# Patient Record
Sex: FEMALE | Race: BLACK OR AFRICAN AMERICAN | NOT HISPANIC OR LATINO | URBAN - METROPOLITAN AREA
[De-identification: names, ages, dates, MRNs, and addresses within clinical notes are randomized per-mention and may not be internally consistent; named-entity substitution may affect disease eponyms.]

---

## 2018-01-24 ENCOUNTER — EMERGENCY (EMERGENCY)
Facility: HOSPITAL | Age: 53
LOS: 1 days | Discharge: HOME | End: 2018-01-24
Attending: EMERGENCY MEDICINE | Admitting: EMERGENCY MEDICINE
Payer: MEDICAID

## 2018-01-24 VITALS
HEART RATE: 101 BPM | DIASTOLIC BLOOD PRESSURE: 87 MMHG | TEMPERATURE: 99 F | SYSTOLIC BLOOD PRESSURE: 136 MMHG | OXYGEN SATURATION: 99 % | HEIGHT: 72 IN | WEIGHT: 293 LBS | RESPIRATION RATE: 16 BRPM

## 2018-01-24 VITALS
HEART RATE: 98 BPM | DIASTOLIC BLOOD PRESSURE: 89 MMHG | TEMPERATURE: 98 F | SYSTOLIC BLOOD PRESSURE: 144 MMHG | RESPIRATION RATE: 18 BRPM | OXYGEN SATURATION: 98 %

## 2018-01-24 DIAGNOSIS — Z98.89 OTHER SPECIFIED POSTPROCEDURAL STATES: Chronic | ICD-10-CM

## 2018-01-24 LAB
ALBUMIN SERPL ELPH-MCNC: 4.2 G/DL — SIGNIFICANT CHANGE UP (ref 3.3–5.2)
ALP SERPL-CCNC: 46 U/L — SIGNIFICANT CHANGE UP (ref 40–120)
ALT FLD-CCNC: 19 U/L — SIGNIFICANT CHANGE UP
ANION GAP SERPL CALC-SCNC: 12 MMOL/L — SIGNIFICANT CHANGE UP (ref 5–17)
APPEARANCE UR: CLEAR — SIGNIFICANT CHANGE UP
AST SERPL-CCNC: 30 U/L — SIGNIFICANT CHANGE UP
BASOPHILS # BLD AUTO: 0 K/UL — SIGNIFICANT CHANGE UP (ref 0–0.2)
BASOPHILS NFR BLD AUTO: 1 % — SIGNIFICANT CHANGE UP (ref 0–2)
BILIRUB SERPL-MCNC: 0.5 MG/DL — SIGNIFICANT CHANGE UP (ref 0.4–2)
BILIRUB UR-MCNC: NEGATIVE — SIGNIFICANT CHANGE UP
BUN SERPL-MCNC: 11 MG/DL — SIGNIFICANT CHANGE UP (ref 8–20)
CALCIUM SERPL-MCNC: 9.6 MG/DL — SIGNIFICANT CHANGE UP (ref 8.6–10.2)
CHLORIDE SERPL-SCNC: 96 MMOL/L — LOW (ref 98–107)
CO2 SERPL-SCNC: 28 MMOL/L — SIGNIFICANT CHANGE UP (ref 22–29)
COLOR SPEC: YELLOW — SIGNIFICANT CHANGE UP
CREAT SERPL-MCNC: 1.02 MG/DL — SIGNIFICANT CHANGE UP (ref 0.5–1.3)
DIFF PNL FLD: ABNORMAL
EOSINOPHIL # BLD AUTO: 0 K/UL — SIGNIFICANT CHANGE UP (ref 0–0.5)
EOSINOPHIL NFR BLD AUTO: 1 % — SIGNIFICANT CHANGE UP (ref 0–5)
GLUCOSE SERPL-MCNC: 133 MG/DL — HIGH (ref 70–115)
GLUCOSE UR QL: NEGATIVE MG/DL — SIGNIFICANT CHANGE UP
HCT VFR BLD CALC: 45.7 % — SIGNIFICANT CHANGE UP (ref 37–47)
HGB BLD-MCNC: 15 G/DL — SIGNIFICANT CHANGE UP (ref 12–16)
KETONES UR-MCNC: NEGATIVE — SIGNIFICANT CHANGE UP
LACTATE BLDV-MCNC: 1.6 MMOL/L — SIGNIFICANT CHANGE UP (ref 0.5–2)
LEUKOCYTE ESTERASE UR-ACNC: ABNORMAL
LYMPHOCYTES # BLD AUTO: 3.2 K/UL — SIGNIFICANT CHANGE UP (ref 1–4.8)
LYMPHOCYTES # BLD AUTO: 54 % — SIGNIFICANT CHANGE UP (ref 20–55)
MCHC RBC-ENTMCNC: 29.2 PG — SIGNIFICANT CHANGE UP (ref 27–31)
MCHC RBC-ENTMCNC: 32.8 G/DL — SIGNIFICANT CHANGE UP (ref 32–36)
MCV RBC AUTO: 89.1 FL — SIGNIFICANT CHANGE UP (ref 81–99)
MONOCYTES # BLD AUTO: 0.6 K/UL — SIGNIFICANT CHANGE UP (ref 0–0.8)
MONOCYTES NFR BLD AUTO: 11 % — HIGH (ref 3–10)
NEUTROPHILS # BLD AUTO: 1.4 K/UL — LOW (ref 1.8–8)
NEUTROPHILS NFR BLD AUTO: 30 % — LOW (ref 37–73)
NITRITE UR-MCNC: NEGATIVE — SIGNIFICANT CHANGE UP
PH UR: 6 — SIGNIFICANT CHANGE UP (ref 5–8)
PLAT MORPH BLD: NORMAL — SIGNIFICANT CHANGE UP
PLATELET # BLD AUTO: 228 K/UL — SIGNIFICANT CHANGE UP (ref 150–400)
POTASSIUM SERPL-MCNC: 4.9 MMOL/L — SIGNIFICANT CHANGE UP (ref 3.5–5.3)
POTASSIUM SERPL-SCNC: 4.9 MMOL/L — SIGNIFICANT CHANGE UP (ref 3.5–5.3)
PROT SERPL-MCNC: 8.1 G/DL — SIGNIFICANT CHANGE UP (ref 6.6–8.7)
PROT UR-MCNC: 30 MG/DL
RBC # BLD: 5.13 M/UL — SIGNIFICANT CHANGE UP (ref 4.4–5.2)
RBC # FLD: 13.6 % — SIGNIFICANT CHANGE UP (ref 11–15.6)
RBC BLD AUTO: NORMAL — SIGNIFICANT CHANGE UP
SODIUM SERPL-SCNC: 136 MMOL/L — SIGNIFICANT CHANGE UP (ref 135–145)
SP GR SPEC: 1.01 — SIGNIFICANT CHANGE UP (ref 1.01–1.02)
UROBILINOGEN FLD QL: NEGATIVE MG/DL — SIGNIFICANT CHANGE UP
VARIANT LYMPHS # BLD: 3 % — SIGNIFICANT CHANGE UP (ref 0–6)
WBC # BLD: 5.3 K/UL — SIGNIFICANT CHANGE UP (ref 4.8–10.8)
WBC # FLD AUTO: 5.3 K/UL — SIGNIFICANT CHANGE UP (ref 4.8–10.8)
WBC UR QL: SIGNIFICANT CHANGE UP

## 2018-01-24 PROCEDURE — 82553 CREATINE MB FRACTION: CPT

## 2018-01-24 PROCEDURE — 99284 EMERGENCY DEPT VISIT MOD MDM: CPT | Mod: 25

## 2018-01-24 PROCEDURE — 85027 COMPLETE CBC AUTOMATED: CPT

## 2018-01-24 PROCEDURE — 99284 EMERGENCY DEPT VISIT MOD MDM: CPT

## 2018-01-24 PROCEDURE — 70450 CT HEAD/BRAIN W/O DYE: CPT | Mod: 26

## 2018-01-24 PROCEDURE — 82550 ASSAY OF CK (CPK): CPT

## 2018-01-24 PROCEDURE — 36415 COLL VENOUS BLD VENIPUNCTURE: CPT

## 2018-01-24 PROCEDURE — 70450 CT HEAD/BRAIN W/O DYE: CPT

## 2018-01-24 PROCEDURE — 83605 ASSAY OF LACTIC ACID: CPT

## 2018-01-24 PROCEDURE — 81001 URINALYSIS AUTO W/SCOPE: CPT

## 2018-01-24 PROCEDURE — 96375 TX/PRO/DX INJ NEW DRUG ADDON: CPT

## 2018-01-24 PROCEDURE — 96374 THER/PROPH/DIAG INJ IV PUSH: CPT

## 2018-01-24 PROCEDURE — 80053 COMPREHEN METABOLIC PANEL: CPT

## 2018-01-24 RX ORDER — SODIUM CHLORIDE 9 MG/ML
1000 INJECTION INTRAMUSCULAR; INTRAVENOUS; SUBCUTANEOUS ONCE
Qty: 0 | Refills: 0 | Status: COMPLETED | OUTPATIENT
Start: 2018-01-24 | End: 2018-01-24

## 2018-01-24 RX ORDER — MORPHINE SULFATE 50 MG/1
4 CAPSULE, EXTENDED RELEASE ORAL ONCE
Qty: 0 | Refills: 0 | Status: DISCONTINUED | OUTPATIENT
Start: 2018-01-24 | End: 2018-01-24

## 2018-01-24 RX ORDER — ONDANSETRON 8 MG/1
4 TABLET, FILM COATED ORAL ONCE
Qty: 0 | Refills: 0 | Status: COMPLETED | OUTPATIENT
Start: 2018-01-24 | End: 2018-01-24

## 2018-01-24 RX ORDER — METOCLOPRAMIDE HCL 10 MG
10 TABLET ORAL ONCE
Qty: 0 | Refills: 0 | Status: COMPLETED | OUTPATIENT
Start: 2018-01-24 | End: 2018-01-24

## 2018-01-24 RX ORDER — KETOROLAC TROMETHAMINE 30 MG/ML
30 SYRINGE (ML) INJECTION ONCE
Qty: 0 | Refills: 0 | Status: DISCONTINUED | OUTPATIENT
Start: 2018-01-24 | End: 2018-01-24

## 2018-01-24 RX ADMIN — Medication 30 MILLIGRAM(S): at 14:45

## 2018-01-24 RX ADMIN — Medication 10 MILLIGRAM(S): at 13:44

## 2018-01-24 RX ADMIN — ONDANSETRON 4 MILLIGRAM(S): 8 TABLET, FILM COATED ORAL at 13:09

## 2018-01-24 RX ADMIN — SODIUM CHLORIDE 1000 MILLILITER(S): 9 INJECTION INTRAMUSCULAR; INTRAVENOUS; SUBCUTANEOUS at 13:10

## 2018-01-24 RX ADMIN — MORPHINE SULFATE 4 MILLIGRAM(S): 50 CAPSULE, EXTENDED RELEASE ORAL at 17:31

## 2018-01-24 RX ADMIN — Medication 30 MILLIGRAM(S): at 13:43

## 2018-01-24 NOTE — ED ADULT NURSE NOTE - CHPI ED SYMPTOMS NEG
no change in level of consciousness/no blurred vision/no weakness/no dizziness/no confusion/no vomiting/no loss of consciousness

## 2018-01-24 NOTE — ED ADULT TRIAGE NOTE - CHIEF COMPLAINT QUOTE
pt presents to ED with fever, headache, body ache, nausea and lower back pain. pt went to urgent care, with negative flu swab. pt sent to ED to r/o meningitis.

## 2018-01-24 NOTE — ED ADULT NURSE NOTE - OBJECTIVE STATEMENT
pt presents to ED with fever, headache, body ache, nausea and lower back pain for three days  pt went to urgent care, with negative flu swab. pt sent to ED to r/o meningitis.

## 2018-01-24 NOTE — ED STATDOCS - OBJECTIVE STATEMENT
51 y/o F pt with no significant medical hx presents to ED c/o HA, body aches and fever for the last few days.  Pt notes nausea. Denies vomiting, diarrhea, urinary issues, cough, and recent travel. No further complaints at this time.

## 2018-04-17 NOTE — ED ADULT NURSE NOTE - NS PRO PASSIVE SMOKE EXP
Discharge instructions provided and reviewed.  Denies questions. No acute distress noted. Discharged home ambulatory, driven by significant other. In possession of belongings.  
Unknown

## 2018-10-22 NOTE — ED ADULT NURSE NOTE - PAIN: PRESENCE, MLM
TRANSFER - OUT REPORT:    Verbal report given to [address] [place] [address] [address] on [facility]  being transferred to  for routine post - op       Report consisted of patients Situation, Background, Assessment and   Recommendations(SBAR). Information from the following report(s) SBAR and MAR was reviewed with the receiving nurse. Lines:   Peripheral IV 10/22/18 Left Wrist (Active)   Site Assessment Clean, dry, & intact 10/22/2018 11:33 AM   Phlebitis Assessment 0 10/22/2018 11:33 AM   Infiltration Assessment 0 10/22/2018 11:33 AM   Dressing Status Clean, dry, & intact 10/22/2018 11:33 AM   Dressing Type Tape;Transparent 10/22/2018 11:33 AM   Hub Color/Line Status Pink; Infusing 10/22/2018 11:33 AM        Opportunity for questions and clarification was provided.       Patient transported with:   O2 @ 2 liters complains of pain/discomfort

## 2020-02-12 ENCOUNTER — APPOINTMENT (OUTPATIENT)
Dept: UROGYNECOLOGY | Facility: CLINIC | Age: 55
End: 2020-02-12

## 2020-07-07 ENCOUNTER — EMERGENCY (EMERGENCY)
Facility: HOSPITAL | Age: 55
LOS: 1 days | Discharge: HOME | End: 2020-07-07
Attending: EMERGENCY MEDICINE
Payer: MEDICAID

## 2020-07-07 VITALS
OXYGEN SATURATION: 100 % | TEMPERATURE: 98 F | DIASTOLIC BLOOD PRESSURE: 86 MMHG | HEART RATE: 83 BPM | SYSTOLIC BLOOD PRESSURE: 134 MMHG | RESPIRATION RATE: 20 BRPM | WEIGHT: 293 LBS | HEIGHT: 72 IN

## 2020-07-07 DIAGNOSIS — Z98.89 OTHER SPECIFIED POSTPROCEDURAL STATES: Chronic | ICD-10-CM

## 2020-07-07 LAB
ALBUMIN SERPL ELPH-MCNC: 3.9 G/DL — SIGNIFICANT CHANGE UP (ref 3.3–5.2)
ALP SERPL-CCNC: 62 U/L — SIGNIFICANT CHANGE UP (ref 40–120)
ALT FLD-CCNC: 21 U/L — SIGNIFICANT CHANGE UP
ANION GAP SERPL CALC-SCNC: 13 MMOL/L — SIGNIFICANT CHANGE UP (ref 5–17)
AST SERPL-CCNC: 21 U/L — SIGNIFICANT CHANGE UP
BASOPHILS # BLD AUTO: 0.02 K/UL — SIGNIFICANT CHANGE UP (ref 0–0.2)
BASOPHILS NFR BLD AUTO: 0.4 % — SIGNIFICANT CHANGE UP (ref 0–2)
BILIRUB SERPL-MCNC: 0.2 MG/DL — LOW (ref 0.4–2)
BUN SERPL-MCNC: 20 MG/DL — SIGNIFICANT CHANGE UP (ref 8–20)
CALCIUM SERPL-MCNC: 9.3 MG/DL — SIGNIFICANT CHANGE UP (ref 8.6–10.2)
CHLORIDE SERPL-SCNC: 102 MMOL/L — SIGNIFICANT CHANGE UP (ref 98–107)
CO2 SERPL-SCNC: 24 MMOL/L — SIGNIFICANT CHANGE UP (ref 22–29)
CREAT SERPL-MCNC: 1.14 MG/DL — SIGNIFICANT CHANGE UP (ref 0.5–1.3)
EOSINOPHIL # BLD AUTO: 0.04 K/UL — SIGNIFICANT CHANGE UP (ref 0–0.5)
EOSINOPHIL NFR BLD AUTO: 0.8 % — SIGNIFICANT CHANGE UP (ref 0–6)
GLUCOSE SERPL-MCNC: 222 MG/DL — HIGH (ref 70–99)
HCG SERPL-ACNC: <4 MIU/ML — SIGNIFICANT CHANGE UP
HCT VFR BLD CALC: 42.2 % — SIGNIFICANT CHANGE UP (ref 34.5–45)
HGB BLD-MCNC: 13.6 G/DL — SIGNIFICANT CHANGE UP (ref 11.5–15.5)
IMM GRANULOCYTES NFR BLD AUTO: 0.2 % — SIGNIFICANT CHANGE UP (ref 0–1.5)
LIDOCAIN IGE QN: 46 U/L — SIGNIFICANT CHANGE UP (ref 22–51)
LYMPHOCYTES # BLD AUTO: 2.69 K/UL — SIGNIFICANT CHANGE UP (ref 1–3.3)
LYMPHOCYTES # BLD AUTO: 53.2 % — HIGH (ref 13–44)
MCHC RBC-ENTMCNC: 29.5 PG — SIGNIFICANT CHANGE UP (ref 27–34)
MCHC RBC-ENTMCNC: 32.2 GM/DL — SIGNIFICANT CHANGE UP (ref 32–36)
MCV RBC AUTO: 91.5 FL — SIGNIFICANT CHANGE UP (ref 80–100)
MONOCYTES # BLD AUTO: 0.36 K/UL — SIGNIFICANT CHANGE UP (ref 0–0.9)
MONOCYTES NFR BLD AUTO: 7.1 % — SIGNIFICANT CHANGE UP (ref 2–14)
NEUTROPHILS # BLD AUTO: 1.94 K/UL — SIGNIFICANT CHANGE UP (ref 1.8–7.4)
NEUTROPHILS NFR BLD AUTO: 38.3 % — LOW (ref 43–77)
PLATELET # BLD AUTO: 233 K/UL — SIGNIFICANT CHANGE UP (ref 150–400)
POTASSIUM SERPL-MCNC: 4 MMOL/L — SIGNIFICANT CHANGE UP (ref 3.5–5.3)
POTASSIUM SERPL-SCNC: 4 MMOL/L — SIGNIFICANT CHANGE UP (ref 3.5–5.3)
PROT SERPL-MCNC: 6.9 G/DL — SIGNIFICANT CHANGE UP (ref 6.6–8.7)
RBC # BLD: 4.61 M/UL — SIGNIFICANT CHANGE UP (ref 3.8–5.2)
RBC # FLD: 13 % — SIGNIFICANT CHANGE UP (ref 10.3–14.5)
SODIUM SERPL-SCNC: 139 MMOL/L — SIGNIFICANT CHANGE UP (ref 135–145)
WBC # BLD: 5.06 K/UL — SIGNIFICANT CHANGE UP (ref 3.8–10.5)
WBC # FLD AUTO: 5.06 K/UL — SIGNIFICANT CHANGE UP (ref 3.8–10.5)

## 2020-07-07 PROCEDURE — 80053 COMPREHEN METABOLIC PANEL: CPT

## 2020-07-07 PROCEDURE — 96374 THER/PROPH/DIAG INJ IV PUSH: CPT | Mod: XU

## 2020-07-07 PROCEDURE — 74177 CT ABD & PELVIS W/CONTRAST: CPT | Mod: 26

## 2020-07-07 PROCEDURE — 93010 ELECTROCARDIOGRAM REPORT: CPT

## 2020-07-07 PROCEDURE — 85027 COMPLETE CBC AUTOMATED: CPT

## 2020-07-07 PROCEDURE — 36415 COLL VENOUS BLD VENIPUNCTURE: CPT

## 2020-07-07 PROCEDURE — 99285 EMERGENCY DEPT VISIT HI MDM: CPT

## 2020-07-07 PROCEDURE — 84702 CHORIONIC GONADOTROPIN TEST: CPT

## 2020-07-07 PROCEDURE — 93005 ELECTROCARDIOGRAM TRACING: CPT

## 2020-07-07 PROCEDURE — 83690 ASSAY OF LIPASE: CPT

## 2020-07-07 PROCEDURE — 99284 EMERGENCY DEPT VISIT MOD MDM: CPT | Mod: 25

## 2020-07-07 PROCEDURE — 74177 CT ABD & PELVIS W/CONTRAST: CPT

## 2020-07-07 RX ORDER — KETOROLAC TROMETHAMINE 30 MG/ML
15 SYRINGE (ML) INJECTION ONCE
Refills: 0 | Status: DISCONTINUED | OUTPATIENT
Start: 2020-07-07 | End: 2020-07-07

## 2020-07-07 RX ORDER — SODIUM CHLORIDE 9 MG/ML
1000 INJECTION INTRAMUSCULAR; INTRAVENOUS; SUBCUTANEOUS ONCE
Refills: 0 | Status: COMPLETED | OUTPATIENT
Start: 2020-07-07 | End: 2020-07-07

## 2020-07-07 RX ADMIN — SODIUM CHLORIDE 1000 MILLILITER(S): 9 INJECTION INTRAMUSCULAR; INTRAVENOUS; SUBCUTANEOUS at 05:50

## 2020-07-07 RX ADMIN — Medication 15 MILLIGRAM(S): at 05:49

## 2020-07-07 NOTE — ED PROVIDER NOTE - PHYSICAL EXAMINATION
General: In NAD, but mild discomfort, non-toxic appearing; well nourished/developed. Obese.   Skin: No rashes or lesions. Warm, dry, color normal for race.   Eyes: Sclera anicteric, conjunctivae clear b/l. No discharge. PERRLA, EOMI.  Neck: Supple, FROM.  Cardio: No lifts, heaves, visible pulsations. No thrills. Rate and rhythm regular, S1 & S2 clear. No audible murmur, gallop, or rub.  Resp: Normal AP to lateral diameter, symmetrical excursion b/l. Breath sounds vesicular, symmetrical and without rales, rhonchi or wheezing b/l.  Abd: Non-distended. No scars. Soft, RLQ>RUQ TTP, no masses palpated. No rebound, guarding. +McBurney's point tenderness. Negative Ward's sign. No CVA tenderness.  Neuro: A&Ox3. CN II-XII grossly intact.

## 2020-07-07 NOTE — ED PROVIDER NOTE - CARE PROVIDER_API CALL
Andrea Murray  GASTROENTEROLOGY  [address] [place] [address]  Phone: [phone]  Fax: [phone]  Follow Up Time:

## 2020-07-07 NOTE — ED PROVIDER NOTE - PROGRESS NOTE DETAILS
DOREEN Brumfield: Spoke with radiologist Dominick. Collection originating from peritoneum. No further imaging recommended.

## 2020-07-07 NOTE — ED PROVIDER NOTE - PATIENT PORTAL LINK FT
You can access the FollowMyHealth Patient Portal offered by [facility] by registering at the following website: [email or web address]. By joining [employer]’s FollowMyHealth portal, you will also be able to view your health information using other applications (apps) compatible with our system.

## 2020-07-07 NOTE — ED ADULT NURSE NOTE - OBJECTIVE STATEMENT
Assumed pt care at this time. Pt resting comfortably in stretcher, A&Ox3, NAD noted, respirations even and nonlabored. Pt presents c/o sharp RLQ pain for a few weeks. Pain disrupts sleep, normal PO intake. normal bowel/bladder habits. abd soft/nontender, + bowel sounds. pt endorses nausea without emesis.

## 2020-07-07 NOTE — ED PROVIDER NOTE - CLINICAL SUMMARY MEDICAL DECISION MAKING FREE TEXT BOX
56 yo female presents to ED c/o right-sided abdominal pain x weeks. CT and lab work up remarkable for acute pathology. Patient stable for discharge with appropriate follow up.

## 2020-07-07 NOTE — ED PROVIDER NOTE - NSFOLLOWUPCLINICS_GEN_ALL_ED_FT
[address] [place] [address]  Phone: [phone]  Fax:     [facility]  Acute Care Surgery  [address] [place] [id number]  Phone: [phone]  Fax:   Follow Up Time:

## 2020-07-07 NOTE — ED PROVIDER NOTE - NSFOLLOWUPINSTRUCTIONS_ED_ALL_ED_FT
- Prescription sent to pharmacy.  - Please call to schedule follow up appointment with your primary care physician within 24-48 hours.  - Please seek immediate medical attention for any new/worsening, signs/symptoms, or concerns.    Feel better! - Prescription sent to pharmacy.  - Please call to schedule follow up appointment with your primary care physician within 24-48 hours.  - Please seek immediate medical attention for any new/worsening, signs/symptoms, or concerns.    Feel better!    Abdominal Pain    Many things can cause abdominal pain. Many times, abdominal pain is not caused by a disease and will improve without treatment. Your health care provider will do a physical exam to determine if there is a dangerous cause of your pain; blood tests and imaging may help determine the cause of your pain. However, in many cases, no cause may be found and you may need further testing as an outpatient. Monitor your abdominal pain for any changes.     SEEK IMMEDIATE MEDICAL CARE IF YOU HAVE ANY OF THE FOLLOWING SYMPTOMS: worsening abdominal pain, uncontrollable vomiting, profuse diarrhea, inability to have bowel movements or pass gas, black or bloody stools, fever accompanying chest pain or back pain, or fainting. These symptoms may represent a serious problem that is an emergency. Do not wait to see if the symptoms will go away. Get medical help right away. Call 911 and do not drive yourself to the hospital. - Ibuprofen/acetaminophen for pain.  - Prescription sent to pharmacy.  - Please call to schedule follow up appointment with your primary care physician within 24-48 hours.  - Please seek immediate medical attention for any new/worsening, signs/symptoms, or concerns.    Feel better!    Abdominal Pain    Many things can cause abdominal pain. Many times, abdominal pain is not caused by a disease and will improve without treatment. Your health care provider will do a physical exam to determine if there is a dangerous cause of your pain; blood tests and imaging may help determine the cause of your pain. However, in many cases, no cause may be found and you may need further testing as an outpatient. Monitor your abdominal pain for any changes.     SEEK IMMEDIATE MEDICAL CARE IF YOU HAVE ANY OF THE FOLLOWING SYMPTOMS: worsening abdominal pain, uncontrollable vomiting, profuse diarrhea, inability to have bowel movements or pass gas, black or bloody stools, fever accompanying chest pain or back pain, or fainting. These symptoms may represent a serious problem that is an emergency. Do not wait to see if the symptoms will go away. Get medical help right away. Call 911 and do not drive yourself to the hospital.

## 2020-07-07 NOTE — ED ADULT TRIAGE NOTE - CHIEF COMPLAINT QUOTE
c/o constant right sided abd pain  x few weeks pain is increasing, reports no change in appetite, reports one episode of nausea no episodes of emesis or diarrhea

## 2020-07-07 NOTE — ED PROVIDER NOTE - ATTENDING CONTRIBUTION TO CARE
54 yo F presents to ED c/o R sided abd pain x weeks with assoc nausea.  Pt describes pain as sharp and constant.  No radiation, vomiting, diarrhea, fever or urinary s/s.  No prior surgeries.  On exam pt awake and alert, afebrile, appears uncomfortable.  mm moist, Neck supple, Cor Reg, Lungs clear b/l, Abd soft, obese + RLQ > RUQ tenderness to palp, no R/R/G.  Ext FROM, Neuro non-focal, Will check labs, US, Rx Toradol, check CT abd/pelvis and re-eval

## 2020-07-07 NOTE — ED PROVIDER NOTE - OBJECTIVE STATEMENT
54 yo female presents to ED c/o right-sided abdominal pain x weeks. Describes constant, non-radiating, sharp, RLQ pain, 9/10 in severity. Today with nausea, without vomiting. Did not self medicate PTA. Last colonoscopy 5 years ago. Patient states she has not sought medication attention 2/2 COVID. No further complaints at this time.   Denies abdominal surgeries, ovarian cysts, fibroids, changes in appetite, chest pain, urinary sxms.

## 2020-07-22 ENCOUNTER — APPOINTMENT (OUTPATIENT)
Dept: GASTROENTEROLOGY | Facility: CLINIC | Age: 55
End: 2020-07-22
Payer: MEDICAID

## 2020-07-22 VITALS
HEART RATE: 88 BPM | OXYGEN SATURATION: 98 % | BODY MASS INDEX: 41.09 KG/M2 | TEMPERATURE: 97.3 F | DIASTOLIC BLOOD PRESSURE: 80 MMHG | SYSTOLIC BLOOD PRESSURE: 120 MMHG | HEIGHT: 70 IN | WEIGHT: 287 LBS

## 2020-07-22 DIAGNOSIS — Z87.19 PERSONAL HISTORY OF OTHER DISEASES OF THE DIGESTIVE SYSTEM: ICD-10-CM

## 2020-07-22 DIAGNOSIS — R10.9 UNSPECIFIED ABDOMINAL PAIN: ICD-10-CM

## 2020-07-22 DIAGNOSIS — Z78.9 OTHER SPECIFIED HEALTH STATUS: ICD-10-CM

## 2020-07-22 DIAGNOSIS — F17.200 NICOTINE DEPENDENCE, UNSPECIFIED, UNCOMPLICATED: ICD-10-CM

## 2020-07-22 DIAGNOSIS — Z80.8 FAMILY HISTORY OF MALIGNANT NEOPLASM OF OTHER ORGANS OR SYSTEMS: ICD-10-CM

## 2020-07-22 DIAGNOSIS — Z80.3 FAMILY HISTORY OF MALIGNANT NEOPLASM OF BREAST: ICD-10-CM

## 2020-07-22 PROCEDURE — 99203 OFFICE O/P NEW LOW 30 MIN: CPT

## 2020-07-22 NOTE — PHYSICAL EXAM
[General Appearance - In No Acute Distress] : in no acute distress [General Appearance - Alert] : alert [Sclera] : the sclera and conjunctiva were normal [Extraocular Movements] : extraocular movements were intact [Neck Appearance] : the appearance of the neck was normal [Neck Cervical Mass (___cm)] : no neck mass was observed [Jugular Venous Distention Increased] : there was no jugular-venous distention [Thyroid Nodule] : there were no palpable thyroid nodules [Thyroid Diffuse Enlargement] : the thyroid was not enlarged [Skin Color & Pigmentation] : normal skin color and pigmentation [Skin Turgor] : normal skin turgor [] : no rash [Oriented To Time, Place, And Person] : oriented to person, place, and time [Impaired Insight] : insight and judgment were intact [Affect] : the affect was normal [FreeTextEntry1] : obese

## 2020-07-22 NOTE — HISTORY OF PRESENT ILLNESS
[de-identified] : This is a 55-year-old woman, who presents with right lower quadrant pain and discomfort that is constant. It's not associated with meals or bowel movements. She recently went to the ER and had a CAT scan, which showed a a cystic lesion posterior to the cecum. The differential included pelvic inclusion cyst. She is perimenopausal. She states that she had a recent transvaginal ultrasound approximately 6-7 months ago with her gynecologist and there is no cyst noted. She has not had any vaginal bleeding for several months. In addition, she complains of left lower quadrant pain, associated with diarrhea. She wakes up in the morning with 3-4 bowel movements and has multiple bowel movements during the course of the day. This has been the case for several years. She denies any rectal bleeding or mucus in her stool. She has left lower quadrant pain associated with the change in bowel movements. She thinks she had a colonoscopy 6-7 years ago, which was unremarkable. She denies any weight loss, fevers or chills.  [de-identified] : FINDINGS:\par LOWER CHEST: Within normal limits.\par \par LIVER: Within normal limits.\par BILE DUCTS: Normal caliber.\par GALLBLADDER: Cholelithiasis.\par SPLEEN: Within normal limits.\par PANCREAS: Within normal limits.\par ADRENALS: Within normal limits.\par KIDNEYS/URETERS: Within normal limits.\par \par BLADDER: Within normal limits.\par REPRODUCTIVE ORGANS: Unremarkable.\par \par BOWEL: No bowel obstruction. Appendix is normal.\par PERITONEUM: No ascites. 3.3 x 2.2 cm sized fluid attenuation structure posterior to the cecum (3-78). No fat stranding.\par VESSELS: Within normal limits.\par RETROPERITONEUM/LYMPH NODES: No lymphadenopathy.  \par ABDOMINAL WALL: Within normal limits.\par BONES: Discogenic disease at L5-S1.\par \par IMPRESSION: \par Cholelithiasis.\par 3.3 x 2.2 cm sized fluid attenuation structure in the right lower quadrant. Differentials would include peritoneal inclusion cyst.\par Normal appendix.

## 2020-07-22 NOTE — ASSESSMENT
[FreeTextEntry1] :  55-year-old woman, who presents with left lower quadrant pain and chronic diarrhea. She had a recent CT which showed a RLQ cystic lesion which may be an inclusion cyst. I will send her for a  and MRI abdomen. I will schedule her for a colonoscopy.

## 2020-07-30 LAB
ALBUMIN SERPL ELPH-MCNC: 4.7 G/DL
ALP BLD-CCNC: 61 U/L
ALT SERPL-CCNC: 24 U/L
ANION GAP SERPL CALC-SCNC: 13 MMOL/L
AST SERPL-CCNC: 24 U/L
BILIRUB SERPL-MCNC: 0.5 MG/DL
BUN SERPL-MCNC: 19 MG/DL
CALCIUM SERPL-MCNC: 10.2 MG/DL
CHLORIDE SERPL-SCNC: 102 MMOL/L
CO2 SERPL-SCNC: 26 MMOL/L
CREAT SERPL-MCNC: 1.21 MG/DL
CRP SERPL-MCNC: 0.25 MG/DL
ERYTHROCYTE [SEDIMENTATION RATE] IN BLOOD BY WESTERGREN METHOD: 31 MM/HR
GLUCOSE SERPL-MCNC: 140 MG/DL
POTASSIUM SERPL-SCNC: 4.2 MMOL/L
PROT SERPL-MCNC: 7.3 G/DL
SODIUM SERPL-SCNC: 141 MMOL/L
TTG IGA SER IA-ACNC: <1.2 U/ML
TTG IGA SER-ACNC: NEGATIVE
TTG IGG SER IA-ACNC: 1.3 U/ML
TTG IGG SER IA-ACNC: NEGATIVE

## 2020-07-31 LAB — CGA SERPL-MCNC: 44 NG/ML

## 2020-08-03 ENCOUNTER — OUTPATIENT (OUTPATIENT)
Dept: OUTPATIENT SERVICES | Facility: HOSPITAL | Age: 55
LOS: 1 days | End: 2020-08-03

## 2020-08-03 ENCOUNTER — APPOINTMENT (OUTPATIENT)
Dept: MRI IMAGING | Facility: CLINIC | Age: 55
End: 2020-08-03
Payer: MEDICAID

## 2020-08-03 DIAGNOSIS — Z98.89 OTHER SPECIFIED POSTPROCEDURAL STATES: Chronic | ICD-10-CM

## 2020-08-03 PROCEDURE — 72197 MRI PELVIS W/O & W/DYE: CPT | Mod: 26

## 2020-08-20 DIAGNOSIS — Z01.818 ENCOUNTER FOR OTHER PREPROCEDURAL EXAMINATION: ICD-10-CM

## 2020-08-23 ENCOUNTER — APPOINTMENT (OUTPATIENT)
Dept: DISASTER EMERGENCY | Facility: CLINIC | Age: 55
End: 2020-08-23

## 2020-08-24 LAB — SARS-COV-2 N GENE NPH QL NAA+PROBE: NOT DETECTED

## 2020-08-26 ENCOUNTER — OUTPATIENT (OUTPATIENT)
Dept: OUTPATIENT SERVICES | Facility: HOSPITAL | Age: 55
LOS: 1 days | End: 2020-08-26
Payer: MEDICAID

## 2020-08-26 ENCOUNTER — APPOINTMENT (OUTPATIENT)
Dept: GASTROENTEROLOGY | Facility: GI CENTER | Age: 55
End: 2020-08-26
Payer: MEDICAID

## 2020-08-26 ENCOUNTER — RESULT REVIEW (OUTPATIENT)
Age: 55
End: 2020-08-26

## 2020-08-26 DIAGNOSIS — R10.9 UNSPECIFIED ABDOMINAL PAIN: ICD-10-CM

## 2020-08-26 DIAGNOSIS — Z98.89 OTHER SPECIFIED POSTPROCEDURAL STATES: Chronic | ICD-10-CM

## 2020-08-26 DIAGNOSIS — R93.5 ABNORMAL FINDINGS ON DIAGNOSTIC IMAGING OF OTHER ABDOMINAL REGIONS, INCLUDING RETROPERITONEUM: ICD-10-CM

## 2020-08-26 DIAGNOSIS — R19.7 DIARRHEA, UNSPECIFIED: ICD-10-CM

## 2020-08-26 PROCEDURE — 88305 TISSUE EXAM BY PATHOLOGIST: CPT | Mod: 26

## 2020-08-26 PROCEDURE — 88305 TISSUE EXAM BY PATHOLOGIST: CPT

## 2020-08-26 PROCEDURE — 45380 COLONOSCOPY AND BIOPSY: CPT

## 2020-08-26 NOTE — PROCEDURE
[Abdominal Pain] : abdominal pain [Change in Bowel Habits] : change in bowel habits [Procedure Explained] : The procedure was explained [Allergies Reviewed] : allergies reviewed. [Risks] : Risks [Bleeding] : bleeding risk [Alternatives] : alternatives [Benefits] : benefits [Patient] : the patient [Infection] : risk of infection [Consent Obtained] : written consent was obtained prior to the procedure and is detailed in the patient's record [Bowel Prep Kit] : the patient took the appropriate bowel preparation kit as directed [Approved Diet Followed] : the patient avoided solid foods and adhered to the approved diet list for 24 hours prior to the procedure [Cardiac Monitor] : cardiac monitor [Automated Blood Pressure Cuff] : automated blood pressure cuff [Pulse Oximeter] : pulse oximeter [With Biopsy] : with biopsy [Propofol ___ mg IV] : Propofol [unfilled] ~Umg intravenously [Prep Qualtiy: ___] : Prep Quality:  [unfilled] [2] : 2 [Withdrawal Time: ___] : Withdrawal Time:  [unfilled] [Left Lateral Decubitus] : The patient was positioned in the left lateral decubitus position [Single Pass Needed] : after a single pass [Moderate Difficulty] : with moderate difficulty [Insufflated] : insufflated [Patient Rotated Into Alternating Positions] : the patient was not rotated [Normal] : Normal [Diverticulosis] : diverticulosis [Polyps] : polyps [Sent to Pathology] : was sent to pathology for analysis [Biopsy] : biopsy [Vital Signs Stable] : the vital signs were stable [Tolerated Well] : the patient tolerated the procedure well [No Complications] : There were no complications [de-identified] : biopsies obtained for collagenous or microscopic colitis [de-identified] : [id number] [de-identified] : biopsies for same reason as above [de-identified] : f/u in office. F/U colonoscopy depends upon pathology [de-identified] : very rare diverticulum and tortuous sigmoid-biopsies obtained for same reason as above [de-identified] : 6-6mm ? flat submucosal nodule at 6-7cm biopsied

## 2020-08-26 NOTE — PHYSICAL EXAM
[General Appearance - Alert] : alert [General Appearance - Well Nourished] : well nourished [General Appearance - Well Developed] : well developed [General Appearance - In No Acute Distress] : in no acute distress [FreeTextEntry1] : obese [Sclera] : the sclera and conjunctiva were normal [General Appearance - Well-Appearing] : healthy appearing [PERRL With Normal Accommodation] : pupils were equal in size, round, and reactive to light [Extraocular Movements] : extraocular movements were intact [Outer Ear] : the ears and nose were normal in appearance [Hearing Threshold Finger Rub Not [place]] : hearing was normal [Neck Appearance] : the appearance of the neck was normal [Examination Of The Oral Cavity] : the lips and gums were normal [Heart Rate And Rhythm] : heart rate was normal and rhythm regular [Auscultation Breath Sounds / Voice Sounds] : lungs were clear to auscultation bilaterally [Murmurs] : no murmurs [Heart Sounds] : normal S1 and S2 [Heart Sounds Pericardial Friction Rub] : no pericardial rub [Heart Sounds Gallop] : no gallops [Abdomen Soft] : soft [Abdomen Tenderness] : non-tender [Bowel Sounds] : normal bowel sounds [No Spinal Tenderness] : no spinal tenderness [No CVA Tenderness] : no ~M costovertebral angle tenderness [Abdomen Mass (___ Cm)] : no abdominal mass palpated [Musculoskeletal - Swelling] : no joint swelling seen [Motor Tone] : muscle strength and tone were normal [Nail Clubbing] : no clubbing  or cyanosis of the fingernails [Abnormal Walk] : normal gait [Skin Color & Pigmentation] : normal skin color and pigmentation [Skin Turgor] : normal skin turgor [Motor Exam] : the motor exam was normal [No Focal Deficits] : no focal deficits [] : no rash [Oriented To Time, Place, And Person] : oriented to person, place, and time [Impaired Insight] : insight and judgment were intact [Affect] : the affect was normal

## 2020-08-26 NOTE — PROCEDURE
[Procedure Explained] : The procedure was explained [Abdominal Pain] : abdominal pain [Change in Bowel Habits] : change in bowel habits [Allergies Reviewed] : allergies reviewed. [Risks] : Risks [Benefits] : benefits [Alternatives] : alternatives [Bleeding] : bleeding risk [Infection] : risk of infection [Consent Obtained] : written consent was obtained prior to the procedure and is detailed in the patient's record [Bowel Prep Kit] : the patient took the appropriate bowel preparation kit as directed [Patient] : the patient [Automated Blood Pressure Cuff] : automated blood pressure cuff [Approved Diet Followed] : the patient avoided solid foods and adhered to the approved diet list for 24 hours prior to the procedure [Cardiac Monitor] : cardiac monitor [Pulse Oximeter] : pulse oximeter [With Biopsy] : with biopsy [Propofol ___ mg IV] : Propofol [unfilled] ~Umg intravenously [2] : 2 [Prep Qualtiy: ___] : Prep Quality:  [unfilled] [Withdrawal Time: ___] : Withdrawal Time:  [unfilled] [Left Lateral Decubitus] : The patient was positioned in the left lateral decubitus position [Moderate Difficulty] : with moderate difficulty [Single Pass Needed] : after a single pass [Insufflated] : insufflated [Patient Rotated Into Alternating Positions] : the patient was not rotated [Normal] : Normal [Diverticulosis] : diverticulosis [Biopsy] : biopsy [Sent to Pathology] : was sent to pathology for analysis [Polyps] : polyps [Tolerated Well] : the patient tolerated the procedure well [Vital Signs Stable] : the vital signs were stable [No Complications] : There were no complications [de-identified] : [id number] [de-identified] : biopsies obtained for collagenous or microscopic colitis [de-identified] : biopsies for same reason as above [de-identified] : 6-6mm ? flat submucosal nodule at 6-7cm biopsied [de-identified] : very rare diverticulum and tortuous sigmoid-biopsies obtained for same reason as above [de-identified] : f/u in office. F/U colonoscopy depends upon pathology

## 2020-08-26 NOTE — PHYSICAL EXAM
[General Appearance - Well Developed] : well developed [General Appearance - Alert] : alert [General Appearance - Well Nourished] : well nourished [General Appearance - In No Acute Distress] : in no acute distress [Sclera] : the sclera and conjunctiva were normal [FreeTextEntry1] : obese [General Appearance - Well-Appearing] : healthy appearing [Extraocular Movements] : extraocular movements were intact [PERRL With Normal Accommodation] : pupils were equal in size, round, and reactive to light [Hearing Threshold Finger Rub Not [place]] : hearing was normal [Outer Ear] : the ears and nose were normal in appearance [Examination Of The Oral Cavity] : the lips and gums were normal [Neck Appearance] : the appearance of the neck was normal [Auscultation Breath Sounds / Voice Sounds] : lungs were clear to auscultation bilaterally [Heart Rate And Rhythm] : heart rate was normal and rhythm regular [Murmurs] : no murmurs [Heart Sounds Pericardial Friction Rub] : no pericardial rub [Heart Sounds Gallop] : no gallops [Heart Sounds] : normal S1 and S2 [Abdomen Soft] : soft [Abdomen Tenderness] : non-tender [Bowel Sounds] : normal bowel sounds [No CVA Tenderness] : no ~M costovertebral angle tenderness [Abdomen Mass (___ Cm)] : no abdominal mass palpated [No Spinal Tenderness] : no spinal tenderness [Motor Tone] : muscle strength and tone were normal [Abnormal Walk] : normal gait [Nail Clubbing] : no clubbing  or cyanosis of the fingernails [Musculoskeletal - Swelling] : no joint swelling seen [Skin Turgor] : normal skin turgor [Skin Color & Pigmentation] : normal skin color and pigmentation [Motor Exam] : the motor exam was normal [] : no rash [No Focal Deficits] : no focal deficits [Impaired Insight] : insight and judgment were intact [Oriented To Time, Place, And Person] : oriented to person, place, and time [Affect] : the affect was normal

## 2020-08-28 LAB — SURGICAL PATHOLOGY STUDY: SIGNIFICANT CHANGE UP

## 2020-08-31 ENCOUNTER — RESULT REVIEW (OUTPATIENT)
Age: 55
End: 2020-08-31

## 2020-10-12 DIAGNOSIS — M47.816 SPONDYLOSIS W/OUT MYELOPATHY OR RADICULOPATHY, LUMBAR REGION: ICD-10-CM

## 2020-10-13 ENCOUNTER — APPOINTMENT (OUTPATIENT)
Dept: ORTHOPEDIC SURGERY | Facility: CLINIC | Age: 55
End: 2020-10-13
Payer: MEDICAID

## 2020-10-13 VITALS
BODY MASS INDEX: 41.09 KG/M2 | DIASTOLIC BLOOD PRESSURE: 87 MMHG | HEIGHT: 70 IN | HEART RATE: 86 BPM | WEIGHT: 287 LBS | SYSTOLIC BLOOD PRESSURE: 129 MMHG

## 2020-10-13 VITALS — TEMPERATURE: 97.1 F

## 2020-10-13 DIAGNOSIS — Z86.39 PERSONAL HISTORY OF OTHER ENDOCRINE, NUTRITIONAL AND METABOLIC DISEASE: ICD-10-CM

## 2020-10-13 DIAGNOSIS — Z80.0 FAMILY HISTORY OF MALIGNANT NEOPLASM OF DIGESTIVE ORGANS: ICD-10-CM

## 2020-10-13 DIAGNOSIS — M51.36 OTHER INTERVERTEBRAL DISC DEGENERATION, LUMBAR REGION: ICD-10-CM

## 2020-10-13 DIAGNOSIS — M51.16 INTERVERTEBRAL DISC DISORDERS WITH RADICULOPATHY, LUMBAR REGION: ICD-10-CM

## 2020-10-13 PROCEDURE — 72100 X-RAY EXAM L-S SPINE 2/3 VWS: CPT

## 2020-10-13 PROCEDURE — 99204 OFFICE O/P NEW MOD 45 MIN: CPT

## 2020-10-13 NOTE — HISTORY OF PRESENT ILLNESS
[de-identified] : 55 year old  F Presents for evaluation of lower back pain that has been intermittent for many years. Patient has a new complaint of RLE pain, tingling, and numbness persisting for two months. She has had PT and chiropractics in the past and in the last four weeks beginning 09/16/2020 which has helped her back pain but not her RLE pain. Patient is diabetic and has acid reflux. Pain is worse with standing in one position and better with rest. Back pain and leg pain are equal. No pain management injections in the past, pain is a 7/10. [Ataxia] : no ataxia [Incontinence] : no incontinence [Loss of Dexterity] : good dexterity [Urinary Ret.] : no urinary retention

## 2020-10-13 NOTE — DISCUSSION/SUMMARY
[de-identified] : Pt has been referred to physical therapy for decreased pain modalities, core strengthening modalities, soft tissue modalities, and physical modalities.  I will reframe from oral steroids due to the patients diabetes. A lumbar MRI has been ordered and is medically necessary due to persistent pain, failure of conservative measures for four weeks and to further assess her possible disc herniation . Lumbar MRI will help guide treatment plan, possible surgical intervention vs injection therapy with pain management. Patient has been referred to physiatry for assessment regarding epidural injections to help manage pain. The patient will follow up in 3-4 weeks and if there is no significant decrease in pain we will revisit the discussion of a L5-S1 diskectomy on the right to relieve her sciatic pain.

## 2020-10-13 NOTE — PHYSICAL EXAM
[Obese] : obese [Poor Appearance] : well-appearing [Acute Distress] : not in acute distress [de-identified] : CONSTITUTIONAL: The patient is a very pleasant individual who is well-nourished and who appears stated age.\par PSYCHIATRIC: The patient is alert and oriented X 3 and in no apparent distress, and participates with orthopedic evaluation well.\par HEAD: Atraumatic and is nonsyndromic in appearance.\par EENT: No visible thyromegaly, EOMI.\par RESPIRATORY: Respiratory rate is regular, not dyspneic on examination.\par LYMPHATICS: There is no inguinal lymphadenopathy\par INTEGUMENTARY: Skin is clean, dry, and intact about the bilateral lower extremities and lumbar spine.\par VASCULAR: There is brisk capillary refill about the bilateral lower extremities.\par NEUROLOGIC: There are no pathologic reflexes. Deep tendon reflexes are well maintained at 2+/4 of the bilateral lower extremities and are symmetric.\par MUSCULOSKELETAL: There is no visible muscular atrophy. Manual motor strength is well maintained in the bilateral lower extremities.  The patient ambulates in a non-myelopathic manner. Quad extension, ankle dorsiflexion, EHL, plantar flexion, and ankle eversion are well preserved. Normal secondary orthopaedic exam of bilateral hips, greater trochanteric area, knees and ankles. mechanically oriented lower back pain \par \par Positive BL tension sign. Positive straight leg raise on the right. BL decrease in sensation in a stock distribution in the LE consistent with diabetic neuropathy. Right piriformis strain and sprain type symptoms.  [de-identified] : Pelvis MRI ordered by another physician and done in August 2020 demonstrates a disc herniation at L5-S1 and modic type changes of L5-S1.\par \par X-rays of lumbar spine taken on today's date show age-appropriate lumbar spondylosis with focal L5-S1 lumbar degenerative disc disease

## 2020-10-13 NOTE — ADDENDUM
[FreeTextEntry1] : Documented by Preston Lucas acting as a scribe for Rhea Bermeo  on 08/20/2020. All medical record entries made by the Scribe were at my, Rhea Bermeo , direction and personally dictated by me on 08/20/2020 . I have reviewed the chart and agree that the record accurately reflects my personal performance of the history, physical exam, assessment and plan. I have also personally directed, reviewed, and agreed with the chart.

## 2020-10-20 ENCOUNTER — APPOINTMENT (OUTPATIENT)
Dept: GASTROENTEROLOGY | Facility: CLINIC | Age: 55
End: 2020-10-20
Payer: MEDICAID

## 2020-10-20 VITALS
OXYGEN SATURATION: 98 % | HEIGHT: 61 IN | HEART RATE: 94 BPM | BODY MASS INDEX: 34.93 KG/M2 | TEMPERATURE: 97.8 F | SYSTOLIC BLOOD PRESSURE: 123 MMHG | DIASTOLIC BLOOD PRESSURE: 93 MMHG | WEIGHT: 185 LBS | RESPIRATION RATE: 14 BRPM

## 2020-10-20 DIAGNOSIS — R19.7 DIARRHEA, UNSPECIFIED: ICD-10-CM

## 2020-10-20 PROCEDURE — 99072 ADDL SUPL MATRL&STAF TM PHE: CPT

## 2020-10-20 PROCEDURE — 99214 OFFICE O/P EST MOD 30 MIN: CPT | Mod: 25

## 2020-10-20 RX ORDER — CHOLESTYRAMINE 4 G/9G
4 POWDER, FOR SUSPENSION ORAL
Qty: 1 | Refills: 5 | Status: ACTIVE | COMMUNITY
Start: 2020-10-20 | End: 1900-01-01

## 2020-10-20 NOTE — PHYSICAL EXAM
[General Appearance - Alert] : alert [General Appearance - In No Acute Distress] : in no acute distress [General Appearance - Well Nourished] : well nourished [General Appearance - Well Developed] : well developed [General Appearance - Well-Appearing] : healthy appearing [Sclera] : the sclera and conjunctiva were normal [PERRL With Normal Accommodation] : pupils were equal in size, round, and reactive to light [Extraocular Movements] : extraocular movements were intact [Outer Ear] : the ears and nose were normal in appearance [Hearing Threshold Finger Rub Not [place]] : hearing was normal [Examination Of The Oral Cavity] : the lips and gums were normal [Neck Appearance] : the appearance of the neck was normal [Heart Rate And Rhythm] : heart rate was normal and rhythm regular [Bowel Sounds] : normal bowel sounds [Abdomen Soft] : soft [Abdomen Tenderness] : non-tender [No CVA Tenderness] : no ~M costovertebral angle tenderness [Abdomen Mass (___ Cm)] : no abdominal mass palpated [No Spinal Tenderness] : no spinal tenderness [Abnormal Walk] : normal gait [Nail Clubbing] : no clubbing  or cyanosis of the fingernails [Musculoskeletal - Swelling] : no joint swelling seen [Motor Tone] : muscle strength and tone were normal [Skin Color & Pigmentation] : normal skin color and pigmentation [Skin Turgor] : normal skin turgor [] : no rash [Motor Exam] : the motor exam was normal [No Focal Deficits] : no focal deficits [Oriented To Time, Place, And Person] : oriented to person, place, and time [Impaired Insight] : insight and judgment were intact [Affect] : the affect was normal [FreeTextEntry1] : obese

## 2020-10-20 NOTE — ASSESSMENT
[FreeTextEntry1] : The peritoneal cyst is not related to gastrointestinal tract and I do not think he requires any further evaluation and therapy and is probably a benign incidental finding. The patient's symptoms are most consistent with mild irritable bowel syndrome with diarrhea type. I recommended that she start cholestyramine 4 g in 4-6 ounces of water every night at bedtime. He'll be seen again in several months for further followup.

## 2020-10-20 NOTE — HISTORY OF PRESENT ILLNESS
[de-identified] : On August 26 of this year he underwent a colonoscopy by May revealing sigmoid diverticuli and a 6 mm submucosal nodule in the rectum the biopsies of which simply revealed lymphoid tissue. Biopsies were also obtained throughout to rule out microscopic or collagenous colitis and all were unremarkable. She continues to experience anywhere from 3-5 bowel movements per day with occasional loose stool. She also continues to experience occasional vague lower abdominal pain. An MRI of the pelvis revealed that the cyst in the right lower quadrant was simply a benign peritoneal cyst. There was also some vague mild inflammatory change in the pelvis of unknown etiology. There is no rectal bleeding or melena. Her appetite and weight are stable.

## 2021-01-13 ENCOUNTER — APPOINTMENT (OUTPATIENT)
Dept: GASTROENTEROLOGY | Facility: CLINIC | Age: 56
End: 2021-01-13

## 2021-07-20 ENCOUNTER — APPOINTMENT (OUTPATIENT)
Dept: OTOLARYNGOLOGY | Facility: CLINIC | Age: 56
End: 2021-07-20

## 2022-02-02 ENCOUNTER — APPOINTMENT (OUTPATIENT)
Dept: GASTROENTEROLOGY | Facility: CLINIC | Age: 57
End: 2022-02-02
Payer: MEDICAID

## 2022-02-02 VITALS
TEMPERATURE: 98 F | SYSTOLIC BLOOD PRESSURE: 125 MMHG | RESPIRATION RATE: 16 BRPM | OXYGEN SATURATION: 98 % | HEART RATE: 100 BPM | BODY MASS INDEX: 39.2 KG/M2 | HEIGHT: 71 IN | WEIGHT: 280 LBS | DIASTOLIC BLOOD PRESSURE: 90 MMHG

## 2022-02-02 DIAGNOSIS — K58.0 IRRITABLE BOWEL SYNDROME WITH DIARRHEA: ICD-10-CM

## 2022-02-02 DIAGNOSIS — E73.9 LACTOSE INTOLERANCE, UNSPECIFIED: ICD-10-CM

## 2022-02-02 DIAGNOSIS — R10.9 UNSPECIFIED ABDOMINAL PAIN: ICD-10-CM

## 2022-02-02 DIAGNOSIS — K66.8 OTHER SPECIFIED DISORDERS OF PERITONEUM: ICD-10-CM

## 2022-02-02 PROCEDURE — 99214 OFFICE O/P EST MOD 30 MIN: CPT

## 2022-02-02 RX ORDER — CHOLESTYRAMINE 4 G/9G
4 POWDER, FOR SUSPENSION ORAL
Qty: 30 | Refills: 5 | Status: ACTIVE | COMMUNITY
Start: 2022-02-02 | End: 1900-01-01

## 2022-02-02 NOTE — PHYSICAL EXAM
[General Appearance - Alert] : alert [General Appearance - In No Acute Distress] : in no acute distress [General Appearance - Well Nourished] : well nourished [General Appearance - Well Developed] : well developed [General Appearance - Well-Appearing] : healthy appearing [Sclera] : the sclera and conjunctiva were normal [PERRL With Normal Accommodation] : pupils were equal in size, round, and reactive to light [Extraocular Movements] : extraocular movements were intact [Outer Ear] : the ears and nose were normal in appearance [Hearing Threshold Finger Rub Not [place]] : hearing was normal [Examination Of The Oral Cavity] : the lips and gums were normal [Neck Appearance] : the appearance of the neck was normal [Heart Rate And Rhythm] : heart rate was normal and rhythm regular [Bowel Sounds] : normal bowel sounds [Abdomen Soft] : soft [Abdomen Tenderness] : non-tender [Abdomen Mass (___ Cm)] : no abdominal mass palpated [No CVA Tenderness] : no ~M costovertebral angle tenderness [No Spinal Tenderness] : no spinal tenderness [Abnormal Walk] : normal gait [Nail Clubbing] : no clubbing  or cyanosis of the fingernails [Musculoskeletal - Swelling] : no joint swelling seen [Motor Tone] : muscle strength and tone were normal [Skin Color & Pigmentation] : normal skin color and pigmentation [Skin Turgor] : normal skin turgor [] : no rash [Motor Exam] : the motor exam was normal [No Focal Deficits] : no focal deficits [Oriented To Time, Place, And Person] : oriented to person, place, and time [Impaired Insight] : insight and judgment were intact [Affect] : the affect was normal [FreeTextEntry1] : obese

## 2022-02-02 NOTE — ASSESSMENT
[FreeTextEntry1] : At this time I still believe that the predominance of her symptoms are due to mild irritable bowel syndrome with the diarrhea type with perhaps some functional component as well.  The right upper quadrant pain is not consistent with biliary colic although I cannot be altogether certain.  I have recommended that she undergo a surgical consultation.  In the meantime I have also recommended that she restart the cholestyramine to be taken every evening at bedtime.  She will be seen again in 4 months for further follow-up.

## 2022-02-02 NOTE — HISTORY OF PRESENT ILLNESS
[de-identified] : On August 26 0f 202  she underwent a colonoscopy  revealing sigmoid diverticuli and a 6 mm submucosal nodule in the rectum the biopsies of which simply revealed lymphoid tissue. Biopsies were also obtained throughout to rule out microscopic or collagenous colitis and all were unremarkable.  At the time of her last visit in October 2020 I felt that her symptoms are due to mild irritable bowel syndrome of the diarrhea type and she was placed on cholestyramine 4 g p.o. nightly.  She apparently took the supplement for an unknown period of time during which she may have felt better but did not like the taste and discontinued the medication.  She is now experiencing about 2-3 bowel movements daily which are only occasionally loose.  Lower abdominal pain is less frequent and does not appear to be of any significant concern to her.  Shortly after last being seen she began to experience daily right upper quadrant pain that would usually occur in the evening after eating and was more likely to occur if she ate a large meal late at night.  The pain is described as crampy and can last throughout the night.  A CAT scan in 2020 did show the presence of gallstones which I felt were asymptomatic and were an incidental finding.  There is no nausea or vomiting.  Her appetite and weight are stable.

## 2022-10-26 NOTE — REVIEW OF SYSTEMS
10/26/2022    Patient Information  Claudette L McManus                                                                                          [id number]  [place] [address]      1943  [unfilled]  There is no work phone number on file.    Chief Complaint:     Complaining of worsening anxiety.  Wants to discuss blood thinner medication.  Also wants to discuss medication for her diabetes.    History of Present Illness:    Patient with multiple chronic medical problems and multiple stressors here of late including a new diagnosis of breast cancer as well as multiple pulmonary emboli presents today with worsening anxiety.  Patient takes trazodone currently to help her sleep and also is on Cymbalta for depression but this is not helping with her anxiety.  She would like to have some medication to help her cope with all the stressors going on at the present time.  Patient also is here to discuss his blood thinner medication.  She is having a difficult time being able to afford this medication.  See assessment below.  Patient also has with breast cancer and they are holding off on any surgery because they are concerned about her shortness of breath and the risk of surgery.  They placed her on Arimidex and the breast surgeon referred her to a pulmonologist and patient has not heard anything regarding this and its been nearly a month.  See below.  Also patient has type 2 diabetes as well as obesity and has done very well on terzepatide 5 mg weekly.  She wants to consider increasing this medication.  I think that certainly not unreasonable.  Past medical history reviewed and updated were necessary.    Review of Systems   Constitutional: Negative.   HENT: Negative.    Eyes: Negative.    Cardiovascular: Positive for dyspnea on exertion.   Respiratory: Negative.    Endocrine: Negative.    Hematologic/Lymphatic: Negative.    Skin: Negative.    Musculoskeletal: Negative.    Gastrointestinal:  Negative.    Genitourinary: Negative.    Neurological: Negative.    Psychiatric/Behavioral: The patient has insomnia and is nervous/anxious.    Allergic/Immunologic: Negative.        Active Problems:    Patient Active Problem List   Diagnosis   • Primary osteoarthritis involving multiple joints   • Benign essential hypertension   • Hyperlipidemia   • Primary hypothyroidism   • Type 2 diabetes mellitus with diabetic neuropathy, without long-term current use of insulin (HCC)   • Chronic insomnia   • Chronic bilateral low back pain without sciatica   • Chronic bilateral thoracic back pain   • Vitamin D deficiency   • Therapeutic drug monitoring   • Menopausal state, 8/19/2020--normal DEXA.   • Diabetic peripheral neuropathy (HCC)   • Situational mixed anxiety and depressive disorder   • ACE-inhibitor cough   • Hyponatremia   • Dyspnea on exertion   • Multiple pulmonary emboli (HCC)   • Non morbid obesity   • Ductal carcinoma of left breast (HCC)   • Right ventricular enlargement   • Situational anxiety   • Chronic anticoagulation, Xarelto for multiple pulmonary emboli         Past Medical History:   Diagnosis Date   • Benign essential hypertension    • Chronic bilateral low back pain without sciatica 8/16/2013 March 13, 2019--Limited bone scan.  This reveals scintigraphic activity in the spine and at the right shoulder corresponds to change seen on recent x-rays and is best explained by degenerative change.  Isolated metastatic disease exactly corresponding to the sites of extensive degenerative disc change would be peculiar.  March 7, 2019--new patient to get established.  She has complaints of approxi   • Chronic bilateral thoracic back pain 2/26/2019 March 13, 2019--Limited bone scan.  This reveals scintigraphic activity in the spine and at the right shoulder corresponds to change seen on recent x-rays and is best explained by degenerative change.  Isolated metastatic disease exactly corresponding to the sites  of extensive degenerative disc change would be peculiar.  March 1, 2019--x-ray of the lumbar and thoracic spine reveals mild anterior l   • Chronic insomnia 11/4/2014   • Diabetic peripheral neuropathy (HCC) 3/7/2019    Characterized by decreased sensation and paresthesias in both lower extremities described as a burning sensation.  Extends up to the knees.  Reportedly had been evaluated with nerve conduction study in the past.   • Generalized anxiety disorder 5/19/2013   • History of Bell's palsy 5/21/2013    Remote history of Bell's palsy.  Patient does not remember which side of the face.   • Hyperlipidemia    • Impaired fasting glucose    • Major depression    • Menopausal state, 8/19/2020--normal DEXA. 3/7/2019    August 19, 2020--DEXA scan reveals lumbar spine T score 3.8.  Left femoral neck T score 2.5.  Right femoral neck T score 2.2.  Normal bone density.   • Non morbid obesity 8/11/2022   • Peripheral neuropathy, idiopathic 3/7/2019    Characterized by decreased sensation and paresthesias in both lower extremities described as a burning sensation.  Extends up to the knees.  Reportedly had been evaluated with nerve conduction study in the past.   • Primary hypothyroidism 5/19/2013   • Primary osteoarthritis involving multiple joints 4/22/2013   • Rotator cuff arthropathy of right shoulder, 4/20/2021--status post right reverse total shoulder arthroplasty 5/27/2020   • Situational mixed anxiety and depressive disorder 8/21/2019 August 21, 2019--patient seen in follow-up after I called in a prescription for Ativan after the patient's  contacted me a few weeks ago regarding the sudden death of patient's daughter.  This was an apparent suicide and the patient found her daughter dead.  I will not go into details.  Patient reports the Lorazepam has been helpful but she is still quite distraught, nervous, and undergoing   • Type 2 diabetes mellitus with diabetic neuropathy, without long-term current use  of insulin (HCC)    • Vitamin D deficiency 2/26/2019         Past Surgical History:   Procedure Laterality Date   • BILATERAL BREAST REDUCTION  Early 2000    Early 2000--bilateral breast reduction   • CARDIAC CATHETERIZATION N/A 9/26/2022    Procedure: Right Heart Cath;  Surgeon: Agus Solorio MD PhD;  Location:  [facility]AS[facility];  Service: Cardiology;  Laterality: N/A;  Will REMAIN on Xarelto for the case   • CHOLECYSTECTOMY  1960s 1960s--open cholecystectomy   • COLONOSCOPY  2012 2012--reportedly normal colonoscopy   • INCONTINENCE SURGERY  2012 Approximately 2012--implantation of questionable bladder stimulator right sacral area for urinary incontinence.  Details not known.   • REPLACEMENT TOTAL KNEE BILATERAL Left 2010; 2011 2010-2011--bilateral total knee replacement   • SUBTOTAL HYSTERECTOMY  Remote    Remote partial hysterectomy.  Ovaries intact.   • TOTAL SHOULDER REPLACEMENT Left 2013 2013--left total shoulder replacement.   • TOTAL SHOULDER REPLACEMENT  April 28, 2021 4/20/2021--status post right reverse total shoulder arthroplasty   • TOTAL SHOULDER REVERSE ARTHROPLASTY Right 04/20/2021 4/20/2021--right reverse total shoulder replacement.         Allergies   Allergen Reactions   • Morphine And Related    • Other Other (See Comments)     REJECTED DACRON SUTURES IN THE PAST AND INCISION CAME APART           Current Outpatient Medications:   •  anastrozole (ARIMIDEX) 1 MG tablet, Take 1 tablet by mouth Daily., Disp: 30 tablet, Rfl: 3  •  Cholecalciferol (VITAMIN D3) 2000 UNITS tablet, Take 1 capsule by mouth daily., Disp: , Rfl:   •  diphenhydrAMINE-acetaminophen (TYLENOL PM)  MG tablet per tablet, Take 2 tablets by mouth Every Night., Disp: , Rfl:   •  DULoxetine (CYMBALTA) 60 MG capsule, TAKE 1 CAPSULE EVERY DAY AS DIRECTED, Disp: 90 capsule, Rfl: 3  •  ezetimibe (ZETIA) 10 MG tablet, TAKE 1 TABLET EVERY DAY, Disp: 90 tablet, Rfl: 3  •  levothyroxine  (SYNTHROID, LEVOTHROID) 125 MCG tablet, TAKE 1 TABLET EVERY DAY, Disp: 90 tablet, Rfl: 2  •  metroNIDAZOLE (METROGEL) 0.75 % gel, , Disp: , Rfl:   •  ondansetron (ZOFRAN) 8 MG tablet, Take 1 tablet by mouth Every 6 (Six) Hours As Needed for Nausea., Disp: 60 tablet, Rfl: 1  •  rivaroxaban (XARELTO) 20 MG tablet, Take 1 p.o. daily for blood thinner as directed., Disp: 30 tablet, Rfl: 3  •  simvastatin (ZOCOR) 20 MG tablet, TAKE ONE TABLET BY MOUTH DAILY FOR HIGH CHOLESTEROL., Disp: 90 tablet, Rfl: 2  •  traMADol (ULTRAM) 50 MG tablet, 1-2 p.o. every 6 hours as needed pain from peripheral neuropathy.  Not greater than 4 in 24 hours, Disp: 120 tablet, Rfl: 3  •  traZODone (DESYREL) 150 MG tablet, TAKE 1 TABLET EVERY NIGHT, Disp: 90 tablet, Rfl: 3  •  valsartan (DIOVAN) 320 MG tablet, TAKE 1 TABLET EVERY MORNING FOR BLOOD PRESSURE, Disp: 90 tablet, Rfl: 3  •  LORazepam (Ativan) 0.5 MG tablet, Take 1 p.o. twice daily as needed anxiety, Disp: 60 tablet, Rfl: 1  •  Tirzepatide 10 MG/0.5ML solution pen-injector, Inject 10 mg under the skin into the appropriate area as directed 1 (One) Time Per Week., Disp: 2 mL, Rfl: 11      Family History   Problem Relation Age of Onset   • Alcohol abuse Father    • Breast cancer Daughter         40s   • Cancer Other    • Diabetes Other         type II   • Leukemia Grandchild 19         Social History     Socioeconomic History   • Marital status:    Tobacco Use   • Smoking status: Former     Types: Cigarettes     Quit date: 1998     Years since quittin.8   • Smokeless tobacco: Never   Vaping Use   • Vaping Use: Never used   Substance and Sexual Activity   • Alcohol use: Yes     Alcohol/week: 1.0 standard drink     Types: 1 Glasses of wine per week     Comment: current some day   • Drug use: No   • Sexual activity: Not Currently     Partners: Male         Vitals:    10/26/22 0934   BP: 140/80   Pulse: 88   Resp: 18   SpO2: 95%   Weight: 90 kg (198 lb 6.4 oz)   Height: 165.1  "cm (65\")        Body mass index is 33.02 kg/m².      Physical Exam:    General: Alert and oriented x 3.  No acute distress.  Obese.  Normal affect.  HEENT: Pupils equal, round, reactive to light; extraocular movements intact; sclerae nonicteric; pharynx, ear canals and TMs normal.  Neck: Without JVD, thyromegaly, bruit, or adenopathy.  Lungs: Clear to auscultation in all fields.  Heart: Regular rate and rhythm without murmur, rub, gallop, or click.  Abdomen: Soft, nontender, without hepatosplenomegaly or hernia.  Bowel sounds normal.  : Deferred.  Rectal: Deferred.  Extremities: Without clubbing, cyanosis, edema, or pulse deficit.  Neurologic: Intact without focal deficit.  Normal station and gait observed during ingress and egress from the examination room.  Skin: Without significant lesion.  Musculoskeletal: Unremarkable.    Lab/other results:      Assessment/Plan:     Diagnosis Plan   1. Situational anxiety  LORazepam (Ativan) 0.5 MG tablet      2. Multiple pulmonary emboli (HCC)  Ambulatory Referral to Pulmonology      3. Chronic anticoagulation, Xarelto for multiple pulmonary emboli        4. Right ventricular enlargement  Ambulatory Referral to Pulmonology      5. Dyspnea on exertion  Ambulatory Referral to Pulmonology      6. Type 2 diabetes mellitus with diabetic neuropathy, without long-term current use of insulin (HCC)  Tirzepatide 10 MG/0.5ML solution pen-injector        Patient with understandable situational anxiety could benefit from benzodiazepine at least temporarily.  She has chronic dyspnea on exertion and multiple pulmonary emboli but also has a new diagnosis of breast cancer and needs to have breast cancer surgery but this is on hold because of her shortness of breath.  She needs to remain on chronic anticoagulation with Xarelto but this will cost her $300 per month.  I will help facilitate this with samples.  We will take it a week at a time.  She has type 2 diabetes and wants to increase " terzepatide to help with her nonmorbid obesity and I think this is certainly reasonable.    Plan is as follows: Start lorazepam 0.5 mg p.o. twice daily as needed anxiety.  Samples of Xarelto given to last patient at least 3 months.  I am personally attempting to get patient to see the pulmonologist, Dr. Mac and referral placed.  Increase terzepatide to 10 mg subcutaneously weekly.  Keep previously scheduled lab and follow-up        Procedures         [Negative] : Endocrine

## 2023-02-13 ENCOUNTER — APPOINTMENT (OUTPATIENT)
Dept: SURGERY | Facility: CLINIC | Age: 58
End: 2023-02-13
Payer: MEDICAID

## 2023-02-13 VITALS
WEIGHT: 264 LBS | HEART RATE: 66 BPM | TEMPERATURE: 97.5 F | DIASTOLIC BLOOD PRESSURE: 82 MMHG | BODY MASS INDEX: 36.15 KG/M2 | RESPIRATION RATE: 16 BRPM | SYSTOLIC BLOOD PRESSURE: 121 MMHG | HEIGHT: 71.5 IN | OXYGEN SATURATION: 99 %

## 2023-02-13 DIAGNOSIS — R10.11 RIGHT UPPER QUADRANT PAIN: ICD-10-CM

## 2023-02-13 DIAGNOSIS — K80.20 CALCULUS OF GALLBLADDER W/OUT CHOLECYSTITIS W/OUT OBSTRUCTION: ICD-10-CM

## 2023-02-13 PROCEDURE — 99204 OFFICE O/P NEW MOD 45 MIN: CPT

## 2023-02-13 NOTE — HISTORY OF PRESENT ILLNESS
[de-identified] : The patient comes to the office in consultation by Dr. Jose Sweet for evaluation of postprandial right upper abdominal pain and gallstones.  The patient reports that she has been experiencing pain that is worse following meals especially after fried greasy meals and dairy products.  The patient reports that she has had the pain for years but it is getting more frequent

## 2023-02-13 NOTE — DATA REVIEWED
[FreeTextEntry1] : Independent review of the abd/pel CT scan from 2019 reveals a contracted gallbladder with stones

## 2023-02-13 NOTE — CONSULT LETTER
[Dear  ___] : Dear  [unfilled], [Consult Letter:] : I had the pleasure of evaluating your patient, [unfilled]. [Please see my note below.] : Please see my note below. [Consult Closing:] : Thank you very much for allowing me to participate in the care of this patient.  If you have any questions, please do not hesitate to contact me. [Sincerely,] : Sincerely, [FreeTextEntry3] : Dejuan Llamas MD, FACS\par  \par Department of Surgery\par [facility]\par [facility]\par

## 2023-02-13 NOTE — ASSESSMENT
[FreeTextEntry1] : The patient is a 57 year old female with intermittent postprandial right upper abdominal pain, with gallstones. She has been advised that she will benefit from a cholecystectomy.  The risks, benefits, and alternatives including the option of doing nothing to a robotic, possible laparoscopic, and possible open cholecystectomy were discussed.  The potential complications including but not limited to infection, bleeding, bile leak, bowel injury and/or obstruction, and possible bile duct injury were discussed.  The patient understands and wishes to proceed at the next available time.  A total of 60 minutes was spent coordinating the patient's care. \par

## 2023-05-11 ENCOUNTER — EMERGENCY (EMERGENCY)
Facility: HOSPITAL | Age: 58
LOS: 1 days | Discharge: HOME | End: 2023-05-11
Attending: EMERGENCY MEDICINE
Payer: MEDICAID

## 2023-05-11 VITALS
HEART RATE: 92 BPM | WEIGHT: 270.07 LBS | OXYGEN SATURATION: 98 % | SYSTOLIC BLOOD PRESSURE: 143 MMHG | TEMPERATURE: 97 F | DIASTOLIC BLOOD PRESSURE: 103 MMHG | RESPIRATION RATE: 20 BRPM

## 2023-05-11 DIAGNOSIS — Z98.89 OTHER SPECIFIED POSTPROCEDURAL STATES: Chronic | ICD-10-CM

## 2023-05-11 LAB
ALBUMIN SERPL ELPH-MCNC: 4.2 G/DL — SIGNIFICANT CHANGE UP (ref 3.3–5.2)
ALP SERPL-CCNC: 78 U/L — SIGNIFICANT CHANGE UP (ref 40–120)
ALT FLD-CCNC: 25 U/L — SIGNIFICANT CHANGE UP
ANION GAP SERPL CALC-SCNC: 11 MMOL/L — SIGNIFICANT CHANGE UP (ref 5–17)
APTT BLD: 28.6 SEC — SIGNIFICANT CHANGE UP (ref 27.5–35.5)
AST SERPL-CCNC: 28 U/L — SIGNIFICANT CHANGE UP
BASOPHILS # BLD AUTO: 0.03 K/UL — SIGNIFICANT CHANGE UP (ref 0–0.2)
BASOPHILS NFR BLD AUTO: 0.5 % — SIGNIFICANT CHANGE UP (ref 0–2)
BILIRUB SERPL-MCNC: 0.5 MG/DL — SIGNIFICANT CHANGE UP (ref 0.4–2)
BUN SERPL-MCNC: 12 MG/DL — SIGNIFICANT CHANGE UP (ref 8–20)
CALCIUM SERPL-MCNC: 9.6 MG/DL — SIGNIFICANT CHANGE UP (ref 8.4–10.5)
CHLORIDE SERPL-SCNC: 102 MMOL/L — SIGNIFICANT CHANGE UP (ref 96–108)
CO2 SERPL-SCNC: 27 MMOL/L — SIGNIFICANT CHANGE UP (ref 22–29)
CREAT SERPL-MCNC: 0.76 MG/DL — SIGNIFICANT CHANGE UP (ref 0.5–1.3)
EGFR: 91 ML/MIN/1.73M2 — SIGNIFICANT CHANGE UP
EOSINOPHIL # BLD AUTO: 0.03 K/UL — SIGNIFICANT CHANGE UP (ref 0–0.5)
EOSINOPHIL NFR BLD AUTO: 0.5 % — SIGNIFICANT CHANGE UP (ref 0–6)
GLUCOSE SERPL-MCNC: 128 MG/DL — HIGH (ref 70–99)
HCT VFR BLD CALC: 43.5 % — SIGNIFICANT CHANGE UP (ref 34.5–45)
HGB BLD-MCNC: 14.3 G/DL — SIGNIFICANT CHANGE UP (ref 11.5–15.5)
IMM GRANULOCYTES NFR BLD AUTO: 0.2 % — SIGNIFICANT CHANGE UP (ref 0–0.9)
INR BLD: 1.07 RATIO — SIGNIFICANT CHANGE UP (ref 0.88–1.16)
LYMPHOCYTES # BLD AUTO: 3.29 K/UL — SIGNIFICANT CHANGE UP (ref 1–3.3)
LYMPHOCYTES # BLD AUTO: 54.5 % — HIGH (ref 13–44)
MCHC RBC-ENTMCNC: 29.2 PG — SIGNIFICANT CHANGE UP (ref 27–34)
MCHC RBC-ENTMCNC: 32.9 GM/DL — SIGNIFICANT CHANGE UP (ref 32–36)
MCV RBC AUTO: 88.8 FL — SIGNIFICANT CHANGE UP (ref 80–100)
MONOCYTES # BLD AUTO: 0.5 K/UL — SIGNIFICANT CHANGE UP (ref 0–0.9)
MONOCYTES NFR BLD AUTO: 8.3 % — SIGNIFICANT CHANGE UP (ref 2–14)
NEUTROPHILS # BLD AUTO: 2.18 K/UL — SIGNIFICANT CHANGE UP (ref 1.8–7.4)
NEUTROPHILS NFR BLD AUTO: 36 % — LOW (ref 43–77)
PLATELET # BLD AUTO: 224 K/UL — SIGNIFICANT CHANGE UP (ref 150–400)
POTASSIUM SERPL-MCNC: 4.4 MMOL/L — SIGNIFICANT CHANGE UP (ref 3.5–5.3)
POTASSIUM SERPL-SCNC: 4.4 MMOL/L — SIGNIFICANT CHANGE UP (ref 3.5–5.3)
PROT SERPL-MCNC: 7.7 G/DL — SIGNIFICANT CHANGE UP (ref 6.6–8.7)
PROTHROM AB SERPL-ACNC: 12.4 SEC — SIGNIFICANT CHANGE UP (ref 10.5–13.4)
RBC # BLD: 4.9 M/UL — SIGNIFICANT CHANGE UP (ref 3.8–5.2)
RBC # FLD: 12.8 % — SIGNIFICANT CHANGE UP (ref 10.3–14.5)
SODIUM SERPL-SCNC: 140 MMOL/L — SIGNIFICANT CHANGE UP (ref 135–145)
TROPONIN T SERPL-MCNC: <0.01 NG/ML — SIGNIFICANT CHANGE UP (ref 0–0.06)
WBC # BLD: 6.04 K/UL — SIGNIFICANT CHANGE UP (ref 3.8–10.5)
WBC # FLD AUTO: 6.04 K/UL — SIGNIFICANT CHANGE UP (ref 3.8–10.5)

## 2023-05-11 PROCEDURE — 93010 ELECTROCARDIOGRAM REPORT: CPT

## 2023-05-11 PROCEDURE — 71045 X-RAY EXAM CHEST 1 VIEW: CPT | Mod: 26

## 2023-05-11 PROCEDURE — 99285 EMERGENCY DEPT VISIT HI MDM: CPT

## 2023-05-11 PROCEDURE — 71275 CT ANGIOGRAPHY CHEST: CPT | Mod: 26,MA

## 2023-05-11 RX ORDER — MORPHINE SULFATE 50 MG/1
4 CAPSULE, EXTENDED RELEASE ORAL ONCE
Refills: 0 | Status: DISCONTINUED | OUTPATIENT
Start: 2023-05-11 | End: 2023-05-11

## 2023-05-11 NOTE — ED PROVIDER NOTE - OBJECTIVE STATEMENT
56 yo female history of HLD, DM recent cholecystectomy performed at [facility] (4/2023) presents with one day of midsternal chest pain that radiates to her back. Deep breathing makes her pain worse. She has mild SOB with her pain. States that her pain is radiating up her left side neck and down her left arm. Reports that her 56 yo female history of HLD, DM recent cholecystectomy performed at [facility] (4/2023) presents with one day of midsternal chest pain that radiates to her back. Deep breathing makes her pain worse. She has mild SOB with her pain. States that her pain is radiating up her left side neck and down her left arm. Reports that her symptoms have been persistent. She has not taken any medication for her pain. Denies fever/chills, cough, sore throat, back pain, abd pain, n/v/d, urinary complaints, weakness/numbness/tingling in extremities.

## 2023-05-11 NOTE — ED PROVIDER NOTE - PHYSICAL EXAMINATION
Gen: Well appearing in NAD  Head: NC/AT  Neck: trachea midline  Resp:  No distress, lungs cta b/l  Cardiac: heart rrr. No murmur.   Abdomen: Soft, nontender, nondistended.   Ext: no deformities  Neuro:  A&O appears non focal  Skin:  Warm and dry as visualized  Psych:  Normal affect and mood

## 2023-05-11 NOTE — ED PROVIDER NOTE - ATTENDING CONTRIBUTION TO CARE
8 yo female history of HLD, DM recent cholecystectomy performed at [facility] (4/2023) presents with one day of midsternal chest pain that radiates to her back. Deep breathing makes her pain worse.  pe awake alert heent ncat neck supple cor s1s2 lung clear abd soft nontender neuro nonfocal;

## 2023-05-11 NOTE — ED ADULT NURSE REASSESSMENT NOTE - NS ED NURSE REASSESS COMMENT FT1
assumed care from RN Conto.  pt resting comfortably in stretcher.  visible chest rise and fall noted. awaiting CT.  NAD at this time.

## 2023-05-11 NOTE — ED PROVIDER NOTE - PROGRESS NOTE DETAILS
[place]: CTA with no PE, questionable mediastinal hemorrhage. Seen by thoracic surgery, no immediate intervention, recommend serial troponin and cbc, TTE, and will be reviewed with attending in AM. Will hold in ED for final dispo per CTS

## 2023-05-11 NOTE — ED PROVIDER NOTE - CLINICAL SUMMARY MEDICAL DECISION MAKING FREE TEXT BOX
58 yo female presents with chest pain, SOB. Consider ACS, MI, PE among other causes of the patient's symptoms. Check labs, ekg, cxr, ct. Monitor and reassess.

## 2023-05-11 NOTE — ED ADULT NURSE NOTE - OBJECTIVE STATEMENT
c/o CP x 2 days. Pt stated CP started yesterday, no aggravating factors, took aleve yesterday with no relief, pain persisted so presented to ED. Pt denies HA, dizziness, SOB, N/V/D, fevers, chills, palpitations. Pt AOx4, speaking coherently, respirations even and unlabored on RA, skin warm and dry. Cm and  in place, bed locked in the lowest position, side rails up.

## 2023-05-11 NOTE — ED ADULT NURSE NOTE - NSFALLUNIVINTERV_ED_ALL_ED
Bed/Stretcher in lowest position, wheels locked, appropriate side rails in place/Call bell, personal items and telephone in reach/Instruct patient to call for assistance before getting out of bed/chair/stretcher/Non-slip footwear applied when patient is off stretcher/[place] to call system/Physically safe environment - no spills, clutter or unnecessary equipment/Purposeful proactive rounding/Room/bathroom lighting operational, light cord in reach

## 2023-05-12 VITALS
SYSTOLIC BLOOD PRESSURE: 136 MMHG | DIASTOLIC BLOOD PRESSURE: 87 MMHG | OXYGEN SATURATION: 100 % | RESPIRATION RATE: 18 BRPM | TEMPERATURE: 99 F | HEART RATE: 91 BPM

## 2023-05-12 DIAGNOSIS — S27.892A CONTUSION OF OTHER SPECIFIED INTRATHORACIC ORGANS, INITIAL ENCOUNTER: ICD-10-CM

## 2023-05-12 LAB
HCT VFR BLD CALC: 42.1 % — SIGNIFICANT CHANGE UP (ref 34.5–45)
HGB BLD-MCNC: 13.9 G/DL — SIGNIFICANT CHANGE UP (ref 11.5–15.5)
MCHC RBC-ENTMCNC: 29 PG — SIGNIFICANT CHANGE UP (ref 27–34)
MCHC RBC-ENTMCNC: 33 GM/DL — SIGNIFICANT CHANGE UP (ref 32–36)
MCV RBC AUTO: 87.9 FL — SIGNIFICANT CHANGE UP (ref 80–100)
PLATELET # BLD AUTO: 216 K/UL — SIGNIFICANT CHANGE UP (ref 150–400)
RBC # BLD: 4.79 M/UL — SIGNIFICANT CHANGE UP (ref 3.8–5.2)
RBC # FLD: 12.8 % — SIGNIFICANT CHANGE UP (ref 10.3–14.5)
TROPONIN T SERPL-MCNC: <0.01 NG/ML — SIGNIFICANT CHANGE UP (ref 0–0.06)
WBC # BLD: 7.25 K/UL — SIGNIFICANT CHANGE UP (ref 3.8–10.5)
WBC # FLD AUTO: 7.25 K/UL — SIGNIFICANT CHANGE UP (ref 3.8–10.5)

## 2023-05-12 PROCEDURE — 93005 ELECTROCARDIOGRAM TRACING: CPT

## 2023-05-12 PROCEDURE — C8929: CPT

## 2023-05-12 PROCEDURE — 36415 COLL VENOUS BLD VENIPUNCTURE: CPT

## 2023-05-12 PROCEDURE — G0378: CPT

## 2023-05-12 PROCEDURE — 99285 EMERGENCY DEPT VISIT HI MDM: CPT | Mod: 25

## 2023-05-12 PROCEDURE — 85730 THROMBOPLASTIN TIME PARTIAL: CPT

## 2023-05-12 PROCEDURE — 85025 COMPLETE CBC W/AUTO DIFF WBC: CPT

## 2023-05-12 PROCEDURE — 96374 THER/PROPH/DIAG INJ IV PUSH: CPT | Mod: XU

## 2023-05-12 PROCEDURE — 80053 COMPREHEN METABOLIC PANEL: CPT

## 2023-05-12 PROCEDURE — 71275 CT ANGIOGRAPHY CHEST: CPT | Mod: MA

## 2023-05-12 PROCEDURE — 99283 EMERGENCY DEPT VISIT LOW MDM: CPT

## 2023-05-12 PROCEDURE — 99236 HOSP IP/OBS SAME DATE HI 85: CPT

## 2023-05-12 PROCEDURE — 85027 COMPLETE CBC AUTOMATED: CPT

## 2023-05-12 PROCEDURE — 93306 TTE W/DOPPLER COMPLETE: CPT | Mod: 26

## 2023-05-12 PROCEDURE — 71045 X-RAY EXAM CHEST 1 VIEW: CPT

## 2023-05-12 PROCEDURE — 85610 PROTHROMBIN TIME: CPT

## 2023-05-12 PROCEDURE — 84484 ASSAY OF TROPONIN QUANT: CPT

## 2023-05-12 RX ADMIN — MORPHINE SULFATE 4 MILLIGRAM(S): 50 CAPSULE, EXTENDED RELEASE ORAL at 00:25

## 2023-05-12 RX ADMIN — MORPHINE SULFATE 4 MILLIGRAM(S): 50 CAPSULE, EXTENDED RELEASE ORAL at 01:34

## 2023-05-12 NOTE — CONSULT NOTE ADULT - ASSESSMENT
57yFemale with h/ HLD, NIDDM, presents to ED c/o anterior chest pain x 2 days.  Describes pain as constant, left anterior, pleuritic, waxing and waning, radiating to LUE and neck, with intermittent shortness of breath.  States 8/10 pain at its worst.  States she took 2 tabs Advil last night with minimal improvement.  Denies h/o previous similar chest pain in the past.  States recently had lap CCY at [facility] on 4/16/23.  States post op recovery was uneventful.  Denies recent trauma, falls, or injury.  Denies heavy lifting.  Denies anticoagulation.  States never smoker.  Labs wnl, trop negative x 1.  Pt had CTA Chest to rule out PE due to recent surgery - left anterior mediastinal hyperdensity which may represent mediastinal hematoma vs cluster of abnormal lymph nodes, sequelae of infection or inflammatory process, or mass lesion; small pericardial and left pleural effusion; esophagitis; negative for PE.  Thoracic Surgery consulted for further evaluation.

## 2023-05-12 NOTE — ED CDU PROVIDER DISPOSITION NOTE - CLINICAL COURSE
Patient s/o to me by Dr Oliveira to follow up with CT surgery and CT surgery seen patient and recommend dc home and follow up in the clinic

## 2023-05-12 NOTE — CONSULT NOTE ADULT - PROBLEM SELECTOR RECOMMENDATION 9
CTA chest as above.  Currently stable on room air.  Recommend pain control prn.  Recommend trending troponins, repeat labs in AM to monitor for change in H/H.  Continue to monitor on telemetry, pulse ox.  Recommend echocardiogram for further evaluation in AM.  Thoracic attending to review imaging in AM.  Additional recommendations to follow.  Please call thoracic ACP should pt become unstable.    Plan to be discussed with Thoracic Surgery attendings in AM.

## 2023-05-12 NOTE — ED CDU PROVIDER INITIAL DAY NOTE - OBJECTIVE STATEMENT
56 yo female history of HLD, DM recent cholecystectomy performed at [facility] (4/2023) presents with one day of midsternal chest pain that radiates to her back. Deep breathing makes her pain worse. She has mild SOB with her pain. States that her pain is radiating up her left side neck and down her left arm. Reports that her symptoms have been persistent. She has not taken any medication for her pain. Denies fever/chills, cough, sore throat, back pain, abd pain, n/v/d, urinary complaints, weakness/numbness/tingling in extremities.

## 2023-05-12 NOTE — ED ADULT NURSE REASSESSMENT NOTE - NS ED NURSE REASSESS COMMENT FT1
Call placed to CT surgery, they were made aware the pts TTE was performed and patient is resting comfortably, pt requested a meal tray, call placed to CT confirms that patient may have a tray if Emergency Department physician has no contraindication, Emergency Department MD states it is ok to provide a diet tray, pt educated on plan of care, education deemed successful after teach back shows proficiency, pt is awaiting evaluation from CT surgery.

## 2023-05-12 NOTE — CHART NOTE - NSCHARTNOTEFT_GEN_A_CORE
Thoracic surgery consulted for incidental finding of mediastinal hyperdensity ( ?collection vs mass vs lymph node ) seen on CTA. Workup otherwise unremarkable. TTE performed (results as below) unremarkable. Pericardial effusion noted on CTA not seen on TTE. Pleural effusion noted on CTA is minimal and too small for safe drainage. Patient remains hemodynamically stable and original presenting symptoms have resolved. OK for discharge from thoracic surgery perspective. Will need outpatient MRI of chest w/contrast for further workup. Patient may follow up with Dr. Shepherd in CT Surgery office located on the first floor of [facility] [phone]      < from: TTE Echo Complete w/ Contrast w/ Doppler (05.12.23 @ 09:03) >    Summary:   1. Endocardial visualization was enhanced with intravenous echo contrast.   2. Left ventricular ejection fraction, by visual estimation, is 65 to   70%.   3. Normal global left ventricular systolic function.   4. Mildly increased LV wall thickness.   5. Normal left atrial size.   6. Mild mitral annular calcification.   7. Trace mitral valve regurgitation.   8. Mild pulmonic valve regurgitation.    RAMON Farmer Electronically signed on 5/12/2023 at 12:51:27 PM    < end of copied text >    < from: CT Angio Chest PE Protocol w/ IV Cont (05.11.23 @ 22:09) >    There is a small pericardial and left pleural effusion.    Along the left anterior mediastinum there is an ill-defined region of   hyperdensity, which is of uncertain etiology. This may represent a   mediastinal hematoma. Other differential considerations include cluster   of abnormal lymph nodes, sequelae of infection or inflammatory process or   mass lesion. Follow-up can be obtained as deemed clinically indicated.   Gated MRI or echocardiography may also be helpful for further delineation.    No central pulmonary embolus. Evaluation beyond the segmental pulmonary   arteries is limited secondary to artifact and suboptimal opacification.    < end of copied text > Thoracic surgery consulted for incidental finding of mediastinal hyperdensity ( ?collection vs mass vs lymph node ) seen on CTA. Workup otherwise unremarkable. TTE performed (results as below) unremarkable. Pericardial effusion noted on CTA not seen on TTE. Pleural effusion noted on CTA is minimal and too small for safe drainage. Patient remains hemodynamically stable and original presenting symptoms have resolved. OK for discharge from thoracic surgery perspective. Will need outpatient MRI of chest w/contrast for further workup. Patient may follow up with Dr. Shepherd in CT Surgery office located on the first floor of [facility] [phone]    Above plan d/w Dr. Shepherd    < from: TTE Echo Complete w/ Contrast w/ Doppler (05.12.23 @ 09:03) >    Summary:   1. Endocardial visualization was enhanced with intravenous echo contrast.   2. Left ventricular ejection fraction, by visual estimation, is 65 to   70%.   3. Normal global left ventricular systolic function.   4. Mildly increased LV wall thickness.   5. Normal left atrial size.   6. Mild mitral annular calcification.   7. Trace mitral valve regurgitation.   8. Mild pulmonic valve regurgitation.    RAMON Farmer Electronically signed on 5/12/2023 at 12:51:27 PM    < end of copied text >    < from: CT Angio Chest PE Protocol w/ IV Cont (05.11.23 @ 22:09) >    There is a small pericardial and left pleural effusion.    Along the left anterior mediastinum there is an ill-defined region of   hyperdensity, which is of uncertain etiology. This may represent a   mediastinal hematoma. Other differential considerations include cluster   of abnormal lymph nodes, sequelae of infection or inflammatory process or   mass lesion. Follow-up can be obtained as deemed clinically indicated.   Gated MRI or echocardiography may also be helpful for further delineation.    No central pulmonary embolus. Evaluation beyond the segmental pulmonary   arteries is limited secondary to artifact and suboptimal opacification.    < end of copied text >

## 2023-05-12 NOTE — CONSULT NOTE ADULT - SUBJECTIVE AND OBJECTIVE BOX
SUBJECTIVE    HPI 57yFemale with h/ HLD, NIDDM, presents to ED c/o anterior chest pain x 2 days.  Describes pain as constant, left anterior, pleuritic, waxing and waning, radiating to LUE and neck, with intermittent shortness of breath.  States 8/10 pain at its worst.  States she took 2 tabs Advil last night with minimal improvement.  Denies h/o previous similar chest pain in the past.  States recently had lap CCY at [facility] on 4/16/23.  States post op recovery was uneventful.  Denies recent trauma, falls, or injury.  Denies heavy lifting.  Denies anticoagulation.  States never smoker.  Labs wnl, trop negative x 1.  Pt had CTA Chest to rule out PE due to recent surgery - left anterior mediastinal hyperdensity which may represent mediastinal hematoma vs cluster of abnormal lymph nodes, sequelae of infection or inflammatory process, or mass lesion; small pericardial and left pleural effusion; esophagitis; negative for PE.  Thoracic Surgery consulted for further evaluation.  Denies f/c, n/v, abdominal pain, d/c, urinary symptoms, recent illness, cough.      LAST BLOOD THINNER--> None      SOCIAL HISTORY   smoking history - denies  drinking history - denies        PAST MEDICAL / SURGICAL HISTORY   No pertinent past medical history    H/O bilateral breast reduction surgery        HOME MEDICATIONS  Trulicity    Unknown cholesterol medication        ALLERGIES    No Known Allergies        OBJECTIVE DATA    VITALS   currently in sinus rhythm  ICU Vital Signs Last 24 Hrs  T(C): 36.9 (11 May 2023 23:31), Max: 37 (11 May 2023 20:18)  T(F): 98.5 (11 May 2023 23:31), Max: 98.6 (11 May 2023 20:18)  HR: 91 (11 May 2023 23:31) (74 - 92)  BP: 140/96 (11 May 2023 23:31) (133/90 - 143/103)  RR: 18 (11 May 2023 23:31) (18 - 20)  SpO2: 97% (11 May 2023 23:31) (95% - 98%)    O2 Parameters below as of 11 May 2023 23:31  Patient On (Oxygen Delivery Method): room air      LABS                         14.3   6.04  )-----------( 224      ( 11 May 2023 17:30 )             43.5   PT/INR - ( 11 May 2023 17:30 )   PT: 12.4 sec;   INR: 1.07 ratio         PTT - ( 11 May 2023 17:30 )  PTT:28.6 [id number]    140  |  102  |  12.0  ----------------------------<  128<H>  4.4   |  27.0  |  0.76    Ca    9.6      11 May 2023 18:40    TPro  7.7  /  Alb  4.2  /  TBili  0.5  /  DBili  x   /  AST  28  /  ALT  25  /  AlkPhos  78  05-11  CARDIAC MARKERS ( 11 May 2023 18:40 )  x     / <0.01 ng/mL / x     / x     / x            Physical Exam:    Neuro: A+O x 3, non-focal, speech clear and intact  HEENT: PERRL, EOMI, oral mucosa pink and moist  Neck: supple, no JVD  CV: regular rate, regular rhythm, +S1S2, no murmurs or rub  Pulm/chest: lung sounds CTA and equal bilaterally, no accessory muscle use noted  Abd: soft, NT, ND, +BS  Ext: HINOJOSA x 4, no C/C/E  Skin: warm, well perfused, no rashes        IMAGING   personally reviewed imaging         < from: CT Angio Chest PE Protocol w/ IV Cont (05.11.23 @ 22:09) >    ACC: [id number] EXAM:  CT ANGIO CHEST PULM ART [facility]   ORDERED BY: ZACK BLAKELY     PROCEDURE DATE:  05/11/2023          INTERPRETATION:  CLINICAL INFORMATION: Chest pain    COMPARISON: None.    CONTRAST/COMPLICATIONS:  IV Contrast: Omnipaque 350  68 cc administered   32 cc discarded  Oral Contrast: NONE  Complications: None reported at time of study completion    PROCEDURE:  CT Angiography of the Chest.  Sagittal and coronal reformats were performed as well as 3D (MIP)   reconstructions.    FINDINGS:    LUNGS AND AIRWAYS: Patent central airways.  Atelectatic changes are seen   posteriorly. 3 mm fissural nodule along the right is triangular in shape   likely an intraparenchymal lymph node..  PLEURA: There is a small left pleural effusion..  MEDIASTINUM AND ZORA: No lymphadenopathy. There is diffuse wall   thickening of the esophagus.  VESSELS: The ascending aorta is top normal caliber. The main pulmonary   artery is of normal caliber. There are no filling defects seen within the   main, lobar and segmental pulmonary arteries. Subsegmental evaluation is   limited by motion artifact and suboptimal opacification..  HEART: Heart size is normal. There is small pericardial effusion. Along   the left anterior mediastinum there is ill-defined 2.9 x 2.5 cm region of   hyperdensity, which is of uncertain nature.  CHEST WALL AND LOWER NECK: There is a multinodular appearance of the   visualized thyroid gland. This would be better evaluated with nonemergent   thyroid sonography.  VISUALIZED UPPER ABDOMEN: Cholecystectomy.  BONES: Multilevel degenerative changes of the spine.    IMPRESSION:  There is a small pericardial and left pleural effusion.    Along the left anterior mediastinum there is an ill-defined region of   hyperdensity, which is of uncertain etiology. This may represent a   mediastinal hematoma. Other differential considerations include cluster   of abnormal lymph nodes, sequelae of infection or inflammatory process or   mass lesion. Follow-up can be obtained as deemed clinically indicated.   Gated MRI or echocardiography may also be helpful for further delineation.    No central pulmonary embolus. Evaluation beyond the segmental pulmonary   arteries is limited secondary to artifact and suboptimal opacification.    Esophagitis.    Findings were discussed with Dr. Hdz by telephone at 11:20 PM.    --- End of Report ---    < end of copied text >

## 2023-05-12 NOTE — ED CDU PROVIDER DISPOSITION NOTE - PATIENT PORTAL LINK FT
You can access the FollowMyHealth Patient Portal offered by [facility] by registering at the following website: [email or web address]. By joining [employer]’s FollowMyHealth portal, you will also be able to view your health information using other applications (apps) compatible with our system.

## 2023-05-12 NOTE — ED ADULT NURSE REASSESSMENT NOTE - NS ED NURSE REASSESS COMMENT FT1
Pt is A&O x3, breathing even and unlabored. Pt is resting in stretcher, pt still complains of chest pain.  Pt educated on plan of care, education deemed proficient via teach back method. Safety precautions taken, bed in lowest locked position, side rails up, call bell within reach.

## 2023-05-12 NOTE — ED CDU PROVIDER INITIAL DAY NOTE - CLINICAL SUMMARY MEDICAL DECISION MAKING FREE TEXT BOX
CTA with no PE, questionable mediastinal hemorrhage. Seen by thoracic surgery, no immediate intervention, recommend serial troponin and cbc, TTE, and will be reviewed with attending in AM. Will hold in ED for final dispo per CTS

## 2023-05-12 NOTE — ED ADULT NURSE REASSESSMENT NOTE - NS ED NURSE REASSESS COMMENT FT1
Assumed care 1430, received report from Dot RN, pt a&ox4, respirations even and unlabored on room air, pt states she feels better, no distress noted. Pt pending results of CT.

## 2023-07-11 NOTE — ED ADULT NURSE NOTE - NS ED NURSE RECORD ANOTHER VITAL SIGN
Advance Care Planning   Healthcare Decision Maker:    Primary Decision Maker: Rufino Horne [facility] - [phone] Yes

## 2023-09-18 ENCOUNTER — APPOINTMENT (OUTPATIENT)
Dept: THORACIC SURGERY | Facility: CLINIC | Age: 58
End: 2023-09-18
Payer: MEDICAID

## 2023-09-18 VITALS
DIASTOLIC BLOOD PRESSURE: 77 MMHG | HEART RATE: 100 BPM | OXYGEN SATURATION: 97 % | SYSTOLIC BLOOD PRESSURE: 117 MMHG | HEIGHT: 72 IN | TEMPERATURE: 98.3 F | RESPIRATION RATE: 16 BRPM | BODY MASS INDEX: 37.65 KG/M2 | WEIGHT: 278 LBS

## 2023-09-18 DIAGNOSIS — Z78.9 OTHER SPECIFIED HEALTH STATUS: ICD-10-CM

## 2023-09-18 DIAGNOSIS — R91.8 OTHER NONSPECIFIC ABNORMAL FINDING OF LUNG FIELD: ICD-10-CM

## 2023-09-18 PROCEDURE — 99203 OFFICE O/P NEW LOW 30 MIN: CPT

## 2023-09-18 RX ORDER — DULAGLUTIDE 1.5 MG/.5ML
1.5 INJECTION, SOLUTION SUBCUTANEOUS
Refills: 0 | Status: ACTIVE | COMMUNITY

## 2023-09-18 RX ORDER — ASPIRIN 81 MG
81 TABLET, DELAYED RELEASE (ENTERIC COATED) ORAL
Refills: 0 | Status: ACTIVE | COMMUNITY

## 2024-01-31 ENCOUNTER — OFFICE (OUTPATIENT)
Dept: URBAN - METROPOLITAN AREA CLINIC 12 | Facility: CLINIC | Age: 59
Setting detail: OPHTHALMOLOGY
End: 2024-01-31
Payer: MEDICARE

## 2024-01-31 DIAGNOSIS — E11.9: ICD-10-CM

## 2024-01-31 DIAGNOSIS — H40.013: ICD-10-CM

## 2024-01-31 DIAGNOSIS — H52.4: ICD-10-CM

## 2024-01-31 PROBLEM — H52.7 REFRACTIVE ERROR: Status: ACTIVE | Noted: 2024-01-31

## 2024-01-31 PROCEDURE — 92015 DETERMINE REFRACTIVE STATE: CPT | Performed by: STUDENT IN AN ORGANIZED HEALTH CARE EDUCATION/TRAINING PROGRAM

## 2024-01-31 PROCEDURE — 92133 CPTRZD OPH DX IMG PST SGM ON: CPT | Performed by: STUDENT IN AN ORGANIZED HEALTH CARE EDUCATION/TRAINING PROGRAM

## 2024-01-31 PROCEDURE — 92004 COMPRE OPH EXAM NEW PT 1/>: CPT | Performed by: STUDENT IN AN ORGANIZED HEALTH CARE EDUCATION/TRAINING PROGRAM

## 2024-01-31 ASSESSMENT — REFRACTION_MANIFEST
OD_ADD: +2.25
OS_VA1: 20/20
OD_SPHERE: PLANO
OS_CYLINDER: -0.50
OS_SPHERE: -0.25
OD_CYLINDER: -1.00
OD_VA1: 20/20
OS_AXIS: 075
OD_AXIS: 090
OS_ADD: +2.25

## 2024-01-31 ASSESSMENT — CONFRONTATIONAL VISUAL FIELD TEST (CVF)
OD_FINDINGS: FULL
OS_FINDINGS: FULL

## 2024-01-31 ASSESSMENT — SPHEQUIV_DERIVED: OS_SPHEQUIV: -0.5

## 2024-02-01 NOTE — ED ADULT NURSE NOTE - NS ED NURSE DISCH DISPOSITION
[facility] En[facility]    Provider: SANDIP Schwartz CNP  Facility: [facility]  Facility Type:  LTC    Caller: Lilly  Call Back Number: [phone]    Allergies:    Allergies   Allergen Reactions    Tamoxifen Analogues [Tamoxifen] Unknown     Annotation: hepatitis          Reason for call: Pt continues to c/o upset stomach and nausea. Emesis x1 this morning. Did not eat breakfast, is drinking fluids and took medications. No diarrhea, bowel sounds active. VS stable.  /93 HR 88 RR 20 O2 sat 93% on RA Temp 98.6     Verbal Order/Direction given by Provider:   - Pepcid 20mg PO BID    Provider giving Order:  SANDIP Schwartz CNP    Verbal Order given to: Lilly Randall RN      
Discharged